# Patient Record
Sex: MALE | Race: WHITE | NOT HISPANIC OR LATINO | Employment: STUDENT | URBAN - METROPOLITAN AREA
[De-identification: names, ages, dates, MRNs, and addresses within clinical notes are randomized per-mention and may not be internally consistent; named-entity substitution may affect disease eponyms.]

---
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
## 2021-01-20 ENCOUNTER — IMMUNIZATION (OUTPATIENT)
Dept: VACCINE CLINIC | Facility: HOSPITAL | Age: 21
End: 2021-01-20

## 2021-01-20 PROCEDURE — 0001A: CPT | Performed by: INTERNAL MEDICINE

## 2021-01-20 PROCEDURE — 91300 HC SARSCOV02 VAC 30MCG/0.3ML IM: CPT | Performed by: INTERNAL MEDICINE

## 2021-02-10 ENCOUNTER — IMMUNIZATION (OUTPATIENT)
Dept: VACCINE CLINIC | Facility: HOSPITAL | Age: 21
End: 2021-02-10

## 2021-02-10 PROCEDURE — 0002A: CPT | Performed by: INTERNAL MEDICINE

## 2021-02-10 PROCEDURE — 91300 HC SARSCOV02 VAC 30MCG/0.3ML IM: CPT | Performed by: INTERNAL MEDICINE

## 2021-10-11 ENCOUNTER — OFFICE VISIT (OUTPATIENT)
Dept: INTERNAL MEDICINE | Facility: CLINIC | Age: 21
End: 2021-10-11

## 2021-10-11 VITALS
BODY MASS INDEX: 18.79 KG/M2 | TEMPERATURE: 97.7 F | SYSTOLIC BLOOD PRESSURE: 138 MMHG | WEIGHT: 124 LBS | DIASTOLIC BLOOD PRESSURE: 72 MMHG | HEART RATE: 100 BPM | HEIGHT: 68 IN

## 2021-10-11 DIAGNOSIS — Z13.220 LIPID SCREENING: ICD-10-CM

## 2021-10-11 DIAGNOSIS — Z00.00 ROUTINE MEDICAL EXAM: Primary | ICD-10-CM

## 2021-10-11 DIAGNOSIS — Z13.0 SCREENING FOR DEFICIENCY ANEMIA: ICD-10-CM

## 2021-10-11 DIAGNOSIS — Z13.29 SCREENING FOR ENDOCRINE DISORDER: ICD-10-CM

## 2021-10-11 DIAGNOSIS — Z11.3 ROUTINE SCREENING FOR STI (SEXUALLY TRANSMITTED INFECTION): ICD-10-CM

## 2021-10-11 PROCEDURE — 90471 IMMUNIZATION ADMIN: CPT | Performed by: NURSE PRACTITIONER

## 2021-10-11 PROCEDURE — 99395 PREV VISIT EST AGE 18-39: CPT | Performed by: NURSE PRACTITIONER

## 2021-10-11 PROCEDURE — 90686 IIV4 VACC NO PRSV 0.5 ML IM: CPT | Performed by: NURSE PRACTITIONER

## 2021-10-11 NOTE — PROGRESS NOTES
"Reg Nichols  2000  [id number]  Patient Care Team:  Carmen Arnold APRN as PCP - General (Internal Medicine)    Reg Nichols is a pleasant 21 y.o. male who presents for evaluation of Annual Exam      Chief Complaint   Patient presents with   • Annual Exam       HPI:   Will is here for his routine medical exam.  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  He is from [place].  He is adopted.  He overall feels well, has plenty of energy.  Has a good appetite but has to remind himself to eat when he is focused on a project.  He is always been \"skinny \".  no regular exercise.  No tobacco use or illegal drug use, rarely drinks alcohol.  Wears seatbelts.  Well is homosexual and has had 4 relationships but no intercourse.  He would like to discuss Prep before becoming active.    History reviewed. No pertinent past medical history.  History reviewed. No pertinent surgical history.  Family History   Adopted: Yes   Family history unknown: Yes     Social History     Tobacco Use   Smoking Status Never Smoker   Smokeless Tobacco Never Used     No Known Allergies  No current outpatient medications on file.    Review of Systems  /72 (BP Location: Left arm, Patient Position: Sitting, Cuff Size: Adult)   Pulse 100   Temp 97.7 °F (36.5 °C) (Temporal)   Ht 173.5 cm (68.31\")   Wt 56.2 kg (124 lb)   BMI 18.69 kg/m²     Physical Exam  Constitutional:       Appearance: He is well-developed.   HENT:      Head: Normocephalic and atraumatic.      Comments: *wearing mask  Eyes:      Conjunctiva/sclera: Conjunctivae normal.      Pupils: Pupils are equal, round, and reactive to light.   Cardiovascular:      Rate and Rhythm: Normal rate and regular rhythm.      Pulses: Normal pulses.      Heart sounds: Normal heart sounds.   Pulmonary:      Effort: Pulmonary effort is normal.      Breath sounds: Normal breath sounds.   Musculoskeletal:         General: Normal range of " motion.      Cervical back: Normal range of motion and neck supple.   Skin:     General: Skin is warm and dry.   Neurological:      Mental Status: He is alert and oriented to person, place, and time.   Psychiatric:         Mood and Affect: Mood normal.         Behavior: Behavior normal.         Thought Content: Thought content normal.         Judgment: Judgment normal.         Procedures    Results Review:  None    PHQ-9 Total Score: 0    Assessment/Plan:  Diagnoses and all orders for this visit:    1. Routine medical exam (Primary)  Comments:  Will return for labs    2. Routine screening for STI (sexually transmitted infection)  Comments:  discussed starting Prep after results are available.  Orders:  -     Comprehensive Metabolic Panel; Future  -     RPR; Future  -     HIV-1 & HIV-2 Antibodies; Future  -     Chlamydia trachomatis, Neisseria gonorrhoeae, PCR - Swab, Urine, Clean Catch; Future    3. Screening for endocrine disorder  -     TSH Rfx On Abnormal To Free T4; Future    4. Lipid screening  -     Lipid Panel; Future    5. Screening for deficiency anemia  -     CBC & Differential; Future    Other orders  -     FluLaval/Fluarix (VFC) >6 Months       Patient Instructions   Preventive Care 21-39 Years Old, Male  Preventive care refers to lifestyle choices and visits with your health care provider that can promote health and wellness. This includes:  · A yearly physical exam. This is also called an annual wellness visit.  · Regular dental and eye exams.  · Immunizations.  · Screening for certain conditions.  · Healthy lifestyle choices, such as:  ? Eating a healthy diet.  ? Getting regular exercise.  ? Not using drugs or products that contain nicotine and tobacco.  ? Limiting alcohol use.  What can I expect for my preventive care visit?  Physical exam  Your health care provider may check your:  · Height and weight. These may be used to calculate your BMI (body mass index). BMI is a measurement that tells if you  are at a healthy weight.  · Heart rate and blood pressure.  · Body temperature.  · Skin for abnormal spots.  Counseling  Your health care provider may ask you questions about your:  · Past medical problems.  · Family's medical history.  · Alcohol, tobacco, and drug use.  · Emotional well-being.  · Home life and relationship well-being.  · Sexual activity.  · Diet, exercise, and sleep habits.  · Work and work environment.  · Access to firearms.  What immunizations do I need?    Vaccines are usually given at various ages, according to a schedule. Your health care provider will recommend vaccines for you based on your age, medical history, and lifestyle or other factors, such as travel or where you work.  What tests do I need?  Blood tests  · Lipid and cholesterol levels. These may be checked every 5 years starting at age 20.  · Hepatitis C test.  · Hepatitis B test.  Screening    · Diabetes screening. This is done by checking your blood sugar (glucose) after you have not eaten for a while (fasting).  · Genital exam to check for testicular cancer or hernias.  · STD (sexually transmitted disease) testing, if you are at risk.  Talk with your health care provider about your test results, treatment options, and if necessary, the need for more tests.  Follow these instructions at home:  Eating and drinking    · Eat a healthy diet that includes fresh fruits and vegetables, whole grains, lean protein, and low-fat dairy products.  · Drink enough fluid to keep your urine pale yellow.  · Take vitamin and mineral supplements as recommended by your health care provider.  · Do not drink alcohol if your health care provider tells you not to drink.  · If you drink alcohol:  ? Limit how much you have to 0-2 drinks a day.  ? Be aware of how much alcohol is in your drink. In the [place] one drink equals one 12 oz bottle of beer (355 mL), one 5 oz glass of wine (148 mL), or one 1½ oz glass of hard liquor (44 mL).    Lifestyle  · Take daily  care of your teeth and gums. Brush your teeth every morning and night with fluoride toothpaste. Floss one time each day.  · Stay active. Exercise for at least 30 minutes 5 or more days each week.  · Do not use any products that contain nicotine or tobacco, such as cigarettes, e-cigarettes, and chewing tobacco. If you need help quitting, ask your health care provider.  · Do not use drugs.  · If you are sexually active, practice safe sex. Use a condom or other form of protection to prevent STIs (sexually transmitted infections).  · Find healthy ways to cope with stress, such as:  ? Meditation, yoga, or listening to music.  ? Journaling.  ? Talking to a trusted person.  ? Spending time with friends and family.  Safety  · Always wear your seat belt while driving or riding in a vehicle.  · Do not drive:  ? If you have been drinking alcohol. Do not ride with someone who has been drinking.  ? When you are tired or distracted.  ? While texting.  · Wear a helmet and other protective equipment during sports activities.  · If you have firearms in your house, make sure you follow all gun safety procedures.  · Seek help if you have been physically or sexually abused.  What's next?  · Go to your health care provider once a year for an annual wellness visit.  · Ask your health care provider how often you should have your eyes and teeth checked.  · Stay up to date on all vaccines.  This information is not intended to replace advice given to you by your health care provider. Make sure you discuss any questions you have with your health care provider.  Document Revised: 09/02/2020 Document Reviewed: 12/12/2019  [employer] Patient Education © 2021 [employer]        Plan of care reviewed with patient at the conclusion of today's visit. Education was provided regarding diagnosis, management and any prescribed or recommended OTC medications.  Patient verbalizes understanding of and agreement with management plan.    Return in about 3  months (around 1/11/2022) for Next scheduled follow up.    *Note that portions of this note were completed with a voice recognition program.  Efforts were made to edit the dictation but occasionally words are transcribed.    Carmen Arnold, APRN

## 2021-10-11 NOTE — PATIENT INSTRUCTIONS
Preventive Care 21-39 Years Old, Male  Preventive care refers to lifestyle choices and visits with your health care provider that can promote health and wellness. This includes:  · A yearly physical exam. This is also called an annual wellness visit.  · Regular dental and eye exams.  · Immunizations.  · Screening for certain conditions.  · Healthy lifestyle choices, such as:  ? Eating a healthy diet.  ? Getting regular exercise.  ? Not using drugs or products that contain nicotine and tobacco.  ? Limiting alcohol use.  What can I expect for my preventive care visit?  Physical exam  Your health care provider may check your:  · Height and weight. These may be used to calculate your BMI (body mass index). BMI is a measurement that tells if you are at a healthy weight.  · Heart rate and blood pressure.  · Body temperature.  · Skin for abnormal spots.  Counseling  Your health care provider may ask you questions about your:  · Past medical problems.  · Family's medical history.  · Alcohol, tobacco, and drug use.  · Emotional well-being.  · Home life and relationship well-being.  · Sexual activity.  · Diet, exercise, and sleep habits.  · Work and work environment.  · Access to firearms.  What immunizations do I need?    Vaccines are usually given at various ages, according to a schedule. Your health care provider will recommend vaccines for you based on your age, medical history, and lifestyle or other factors, such as travel or where you work.  What tests do I need?  Blood tests  · Lipid and cholesterol levels. These may be checked every 5 years starting at age 20.  · Hepatitis C test.  · Hepatitis B test.  Screening    · Diabetes screening. This is done by checking your blood sugar (glucose) after you have not eaten for a while (fasting).  · Genital exam to check for testicular cancer or hernias.  · STD (sexually transmitted disease) testing, if you are at risk.  Talk with your health care provider about your test  results, treatment options, and if necessary, the need for more tests.  Follow these instructions at home:  Eating and drinking    · Eat a healthy diet that includes fresh fruits and vegetables, whole grains, lean protein, and low-fat dairy products.  · Drink enough fluid to keep your urine pale yellow.  · Take vitamin and mineral supplements as recommended by your health care provider.  · Do not drink alcohol if your health care provider tells you not to drink.  · If you drink alcohol:  ? Limit how much you have to 0-2 drinks a day.  ? Be aware of how much alcohol is in your drink. In the [place] one drink equals one 12 oz bottle of beer (355 mL), one 5 oz glass of wine (148 mL), or one 1½ oz glass of hard liquor (44 mL).    Lifestyle  · Take daily care of your teeth and gums. Brush your teeth every morning and night with fluoride toothpaste. Floss one time each day.  · Stay active. Exercise for at least 30 minutes 5 or more days each week.  · Do not use any products that contain nicotine or tobacco, such as cigarettes, e-cigarettes, and chewing tobacco. If you need help quitting, ask your health care provider.  · Do not use drugs.  · If you are sexually active, practice safe sex. Use a condom or other form of protection to prevent STIs (sexually transmitted infections).  · Find healthy ways to cope with stress, such as:  ? Meditation, yoga, or listening to music.  ? Journaling.  ? Talking to a trusted person.  ? Spending time with friends and family.  Safety  · Always wear your seat belt while driving or riding in a vehicle.  · Do not drive:  ? If you have been drinking alcohol. Do not ride with someone who has been drinking.  ? When you are tired or distracted.  ? While texting.  · Wear a helmet and other protective equipment during sports activities.  · If you have firearms in your house, make sure you follow all gun safety procedures.  · Seek help if you have been physically or sexually abused.  What's  next?  · Go to your health care provider once a year for an annual wellness visit.  · Ask your health care provider how often you should have your eyes and teeth checked.  · Stay up to date on all vaccines.  This information is not intended to replace advice given to you by your health care provider. Make sure you discuss any questions you have with your health care provider.  Document Revised: 09/02/2020 Document Reviewed: 12/12/2019  [employer] Patient Education © 2021 [employer]

## 2021-11-11 ENCOUNTER — OFFICE VISIT (OUTPATIENT)
Dept: INTERNAL MEDICINE | Facility: CLINIC | Age: 21
End: 2021-11-11

## 2021-11-11 VITALS
WEIGHT: 123 LBS | HEIGHT: 68 IN | BODY MASS INDEX: 18.64 KG/M2 | TEMPERATURE: 98.7 F | SYSTOLIC BLOOD PRESSURE: 110 MMHG | HEART RATE: 84 BPM | DIASTOLIC BLOOD PRESSURE: 70 MMHG

## 2021-11-11 DIAGNOSIS — J02.9 SORE THROAT: Primary | ICD-10-CM

## 2021-11-11 LAB
EXPIRATION DATE: NORMAL
INTERNAL CONTROL: NORMAL
Lab: NORMAL
S PYO RRNA THROAT QL PROBE: NEGATIVE

## 2021-11-11 PROCEDURE — U0004 COV-19 TEST NON-CDC HGH THRU: HCPCS | Performed by: NURSE PRACTITIONER

## 2021-11-11 PROCEDURE — 99213 OFFICE O/P EST LOW 20 MIN: CPT | Performed by: NURSE PRACTITIONER

## 2021-11-11 PROCEDURE — 87651 STREP A DNA AMP PROBE: CPT | Performed by: NURSE PRACTITIONER

## 2021-11-11 RX ORDER — AZITHROMYCIN 250 MG/1
TABLET, FILM COATED ORAL
Qty: 6 TABLET | Refills: 0 | Status: SHIPPED | OUTPATIENT
Start: 2021-11-11 | End: 2022-12-07

## 2021-11-11 NOTE — PROGRESS NOTES
"  Follow Up Office Visit      Patient Name: Reg Nichols  : 2000   MRN: [id number]   Care Team: Patient Care Team:  Carmen Arnold APRN as PCP - General (Internal Medicine)    Chief Complaint:    Chief Complaint   Patient presents with   • Sore Throat       History of Present Illness: Reg Nichols is a 21 y.o. male who is overall healthy with no chronic health conditions.  He presents today for new issue of sore throat x2 days.  Reports he had similar symptoms about 1 month ago and \" just let it run its course\" and symptoms resolved.  However, now started back in the last 2 days.    Denies any associated headaches, fever, chills, earache, nausea, vomiting, cough, wheezing or shortness of breath.    He is noted to be vaccinated for COVID-19, with 2 doses previously.    He denies any known exposure to Covid.  States that his symptoms feel exactly the same as when he has had strep throat in the past as a child.    He is a student at  and has no history of Covid or positive Covid test in the past.    Subjective      Review of Systems:   Review of Systems   Constitutional: Negative for chills and fever.   HENT: Positive for postnasal drip and sore throat.    Respiratory: Negative for cough and shortness of breath.    Cardiovascular: Negative for chest pain.   Neurological: Negative for headache.       I have reviewed and the following portions of the patient's history were updated as appropriate: past family history, past medical history, past social history, past surgical history and problem list.    Medications:     Current Outpatient Medications:   •  azithromycin (Zithromax Z-Uche) 250 MG tablet, Take 2 tablets by mouth on day 1, then 1 tablet daily on days 2-5, Disp: 6 tablet, Rfl: 0    Allergies:   No Known Allergies    Objective     Physical Exam:  Vital Signs:   Vitals:    21 0927   BP: 110/70   BP Location: Left arm   Patient Position: Sitting   Cuff Size: Adult   Pulse: 84   Temp: 98.7 " "°F (37.1 °C)   TempSrc: Temporal   Weight: 55.8 kg (123 lb)   Height: 173.5 cm (68.31\")   PainSc:   4   PainLoc: Throat     Body mass index is 18.53 kg/m².     Physical Exam  Vitals and nursing note reviewed.   Constitutional:       General: He is not in acute distress.     Appearance: Normal appearance.   HENT:      Head: Normocephalic and atraumatic.      Right Ear: Tympanic membrane, ear canal and external ear normal.      Left Ear: Tympanic membrane, ear canal and external ear normal.      Nose: Congestion present.      Mouth/Throat:      Pharynx: Posterior oropharyngeal erythema present.      Comments: PMD noted on posterior pharynx.  Eyes:      General: No scleral icterus.  Cardiovascular:      Rate and Rhythm: Normal rate and regular rhythm.   Pulmonary:      Effort: Pulmonary effort is normal. No respiratory distress.      Breath sounds: Normal breath sounds.   Musculoskeletal:      Cervical back: No tenderness.   Lymphadenopathy:      Cervical: No cervical adenopathy.   Neurological:      Mental Status: He is alert.   Psychiatric:         Mood and Affect: Mood normal.         Thought Content: Thought content normal.         Judgment: Judgment normal.         Assessment / Plan      Assessment/Plan:   Problems Addressed This Visit    ICD-10-CM ICD-9-CM   1. Sore throat  J02.9 462     Sore throat  -Strep screen negative  -Covid test pending at this time  -Provided patient with school excuse x2 days until we get results of Covid test back  -Given his erythema on the oropharynx, covering for strep with course of a azithromycin x5 days (Z-Uche)  -Recommend rest, hydration for now    Plan of care reviewed with patient at the conclusion of today's visit. Education was provided regarding diagnosis and management.  Patient verbalizes understanding of and agreement with management plan.    Patient Instructions   Sore Throat  A sore throat is pain, burning, irritation, or scratchiness in the throat. When you have a sore " throat, you may feel pain or tenderness in your throat when you swallow or talk.  Many things can cause a sore throat, including:  · An infection.  · Seasonal allergies.  · Dryness in the air.  · Irritants, such as smoke or pollution.  · Radiation treatment to the area.  · Gastroesophageal reflux disease (GERD).  · A tumor.  A sore throat is often the first sign of another sickness. It may happen with other symptoms, such as coughing, sneezing, fever, and swollen neck glands. Most sore throats go away without medical treatment.  Follow these instructions at home:         · Take over-the-counter medicines only as told by your health care provider.  ? If your child has a sore throat, do not give your child aspirin because of the association with Reye syndrome.  · Drink enough fluids to keep your urine pale yellow.  · Rest as needed.  · To help with pain, try:  ? Sipping warm liquids, such as broth, herbal tea, or warm water.  ? Eating or drinking cold or frozen liquids, such as frozen ice pops.  ? Gargling with a salt-water mixture 3-4 times a day or as needed. To make a salt-water mixture, completely dissolve ½-1 tsp (3-6 g) of salt in 1 cup (237 mL) of warm water.  ? Sucking on hard candy or throat lozenges.  ? Putting a cool-mist humidifier in your bedroom at night to moisten the air.  ? Sitting in the bathroom with the door closed for 5-10 minutes while you run hot water in the shower.  · Do not use any products that contain nicotine or tobacco, such as cigarettes, e-cigarettes, and chewing tobacco. If you need help quitting, ask your health care provider.  · Wash your hands well and often with soap and water. If soap and water are not available, use hand .  Contact a health care provider if:  · You have a fever for more than 2-3 days.  · You have symptoms that last (are persistent) for more than 2-3 days.  · Your throat does not get better within 7 days.  · You have a fever and your symptoms suddenly get  worse.  · Your child who is 3 months to 3 years old has a temperature of 102.2°F (39°C) or higher.  Get help right away if:  · You have difficulty breathing.  · You cannot swallow fluids, soft foods, or your saliva.  · You have increased swelling in your throat or neck.  · You have persistent nausea and vomiting.  Summary  · A sore throat is pain, burning, irritation, or scratchiness in the throat. Many things can cause a sore throat.  · Take over-the-counter medicines only as told by your health care provider. Do not give your child aspirin.  · Drink plenty of fluids, and rest as needed.  · Contact a health care provider if your symptoms worsen or your sore throat does not get better within 7 days.  This information is not intended to replace advice given to you by your health care provider. Make sure you discuss any questions you have with your health care provider.  Document Revised: 05/20/2019 Document Reviewed: 05/20/2019  [employer] Patient Education © 2021 [employer]        Follow Up:   Return if symptoms worsen or fail to improve.        ELADIA Shi  [facility] [address]    Please note that portions of this note were completed with a voice recognition program.

## 2021-11-11 NOTE — PATIENT INSTRUCTIONS
Sore Throat  A sore throat is pain, burning, irritation, or scratchiness in the throat. When you have a sore throat, you may feel pain or tenderness in your throat when you swallow or talk.  Many things can cause a sore throat, including:  · An infection.  · Seasonal allergies.  · Dryness in the air.  · Irritants, such as smoke or pollution.  · Radiation treatment to the area.  · Gastroesophageal reflux disease (GERD).  · A tumor.  A sore throat is often the first sign of another sickness. It may happen with other symptoms, such as coughing, sneezing, fever, and swollen neck glands. Most sore throats go away without medical treatment.  Follow these instructions at home:         · Take over-the-counter medicines only as told by your health care provider.  ? If your child has a sore throat, do not give your child aspirin because of the association with Reye syndrome.  · Drink enough fluids to keep your urine pale yellow.  · Rest as needed.  · To help with pain, try:  ? Sipping warm liquids, such as broth, herbal tea, or warm water.  ? Eating or drinking cold or frozen liquids, such as frozen ice pops.  ? Gargling with a salt-water mixture 3-4 times a day or as needed. To make a salt-water mixture, completely dissolve ½-1 tsp (3-6 g) of salt in 1 cup (237 mL) of warm water.  ? Sucking on hard candy or throat lozenges.  ? Putting a cool-mist humidifier in your bedroom at night to moisten the air.  ? Sitting in the bathroom with the door closed for 5-10 minutes while you run hot water in the shower.  · Do not use any products that contain nicotine or tobacco, such as cigarettes, e-cigarettes, and chewing tobacco. If you need help quitting, ask your health care provider.  · Wash your hands well and often with soap and water. If soap and water are not available, use hand .  Contact a health care provider if:  · You have a fever for more than 2-3 days.  · You have symptoms that last (are persistent) for more than  2-3 days.  · Your throat does not get better within 7 days.  · You have a fever and your symptoms suddenly get worse.  · Your child who is 3 months to 3 years old has a temperature of 102.2°F (39°C) or higher.  Get help right away if:  · You have difficulty breathing.  · You cannot swallow fluids, soft foods, or your saliva.  · You have increased swelling in your throat or neck.  · You have persistent nausea and vomiting.  Summary  · A sore throat is pain, burning, irritation, or scratchiness in the throat. Many things can cause a sore throat.  · Take over-the-counter medicines only as told by your health care provider. Do not give your child aspirin.  · Drink plenty of fluids, and rest as needed.  · Contact a health care provider if your symptoms worsen or your sore throat does not get better within 7 days.  This information is not intended to replace advice given to you by your health care provider. Make sure you discuss any questions you have with your health care provider.  Document Revised: 05/20/2019 Document Reviewed: 05/20/2019  [employer] Patient Education © 2021 [employer]

## 2021-11-12 ENCOUNTER — TELEPHONE (OUTPATIENT)
Dept: INTERNAL MEDICINE | Facility: CLINIC | Age: 21
End: 2021-11-12

## 2021-11-12 LAB — SARS-COV-2 RNA NOSE QL NAA+PROBE: NOT DETECTED

## 2022-11-30 ENCOUNTER — TELEPHONE (OUTPATIENT)
Dept: INTERNAL MEDICINE | Facility: CLINIC | Age: 22
End: 2022-11-30

## 2022-11-30 NOTE — TELEPHONE ENCOUNTER
Caller: Reg Nichols    Relationship: Self    Best call back number:[phone]    What orders are you requesting (i.e. lab or imaging): FULL PANEL LABS    In what timeframe would the patient need to come in: AS SOON AS POSSIBLE    Additional notes: PLEASE CONTACT PATIENT ONCE ORDERS HAVE BEEN PLACED.

## 2022-12-02 NOTE — TELEPHONE ENCOUNTER
He does not have an appointment scheduled yet.  It looks like he did not get his labs drawn last year after our visit.  I do not mind to put them in but need to know if he wants the STI testing added as well.  May just be better to order at visit so we can everything we need.

## 2022-12-07 ENCOUNTER — LAB (OUTPATIENT)
Dept: LAB | Facility: HOSPITAL | Age: 22
End: 2022-12-07

## 2022-12-07 ENCOUNTER — OFFICE VISIT (OUTPATIENT)
Dept: INTERNAL MEDICINE | Facility: CLINIC | Age: 22
End: 2022-12-07

## 2022-12-07 VITALS
TEMPERATURE: 97.8 F | SYSTOLIC BLOOD PRESSURE: 120 MMHG | DIASTOLIC BLOOD PRESSURE: 78 MMHG | WEIGHT: 122 LBS | HEART RATE: 88 BPM | HEIGHT: 68 IN | BODY MASS INDEX: 18.49 KG/M2

## 2022-12-07 DIAGNOSIS — Z13.220 LIPID SCREENING: ICD-10-CM

## 2022-12-07 DIAGNOSIS — Z11.3 ROUTINE SCREENING FOR STI (SEXUALLY TRANSMITTED INFECTION): ICD-10-CM

## 2022-12-07 DIAGNOSIS — Z00.00 ROUTINE MEDICAL EXAM: Primary | ICD-10-CM

## 2022-12-07 DIAGNOSIS — Z13.0 SCREENING FOR DEFICIENCY ANEMIA: ICD-10-CM

## 2022-12-07 DIAGNOSIS — Z13.29 SCREENING FOR ENDOCRINE DISORDER: ICD-10-CM

## 2022-12-07 LAB
BASOPHILS # BLD AUTO: 0.02 10*3/MM3 (ref 0–0.2)
BASOPHILS NFR BLD AUTO: 0.4 % (ref 0–1.5)
DEPRECATED RDW RBC AUTO: 40.8 FL (ref 37–54)
EOSINOPHIL # BLD AUTO: 0.03 10*3/MM3 (ref 0–0.4)
EOSINOPHIL NFR BLD AUTO: 0.5 % (ref 0.3–6.2)
ERYTHROCYTE [DISTWIDTH] IN BLOOD BY AUTOMATED COUNT: 12.2 % (ref 12.3–15.4)
HCT VFR BLD AUTO: 44.9 % (ref 37.5–51)
HGB BLD-MCNC: 15.8 G/DL (ref 13–17.7)
IMM GRANULOCYTES # BLD AUTO: 0.03 10*3/MM3 (ref 0–0.05)
IMM GRANULOCYTES NFR BLD AUTO: 0.5 % (ref 0–0.5)
LYMPHOCYTES # BLD AUTO: 1.37 10*3/MM3 (ref 0.7–3.1)
LYMPHOCYTES NFR BLD AUTO: 24.4 % (ref 19.6–45.3)
MCH RBC QN AUTO: 32.2 PG (ref 26.6–33)
MCHC RBC AUTO-ENTMCNC: 35.2 G/DL (ref 31.5–35.7)
MCV RBC AUTO: 91.4 FL (ref 79–97)
MONOCYTES # BLD AUTO: 0.42 10*3/MM3 (ref 0.1–0.9)
MONOCYTES NFR BLD AUTO: 7.5 % (ref 5–12)
NEUTROPHILS NFR BLD AUTO: 3.75 10*3/MM3 (ref 1.7–7)
NEUTROPHILS NFR BLD AUTO: 66.7 % (ref 42.7–76)
NRBC BLD AUTO-RTO: 0 /100 WBC (ref 0–0.2)
PLATELET # BLD AUTO: 262 10*3/MM3 (ref 140–450)
PMV BLD AUTO: 8.7 FL (ref 6–12)
RBC # BLD AUTO: 4.91 10*6/MM3 (ref 4.14–5.8)
WBC NRBC COR # BLD: 5.62 10*3/MM3 (ref 3.4–10.8)

## 2022-12-07 PROCEDURE — 87340 HEPATITIS B SURFACE AG IA: CPT

## 2022-12-07 PROCEDURE — 86696 HERPES SIMPLEX TYPE 2 TEST: CPT

## 2022-12-07 PROCEDURE — 36415 COLL VENOUS BLD VENIPUNCTURE: CPT

## 2022-12-07 PROCEDURE — 87591 N.GONORRHOEAE DNA AMP PROB: CPT

## 2022-12-07 PROCEDURE — 80050 GENERAL HEALTH PANEL: CPT

## 2022-12-07 PROCEDURE — 86706 HEP B SURFACE ANTIBODY: CPT

## 2022-12-07 PROCEDURE — 90471 IMMUNIZATION ADMIN: CPT | Performed by: NURSE PRACTITIONER

## 2022-12-07 PROCEDURE — 99395 PREV VISIT EST AGE 18-39: CPT | Performed by: NURSE PRACTITIONER

## 2022-12-07 PROCEDURE — 86803 HEPATITIS C AB TEST: CPT

## 2022-12-07 PROCEDURE — 90686 IIV4 VACC NO PRSV 0.5 ML IM: CPT | Performed by: NURSE PRACTITIONER

## 2022-12-07 PROCEDURE — 86695 HERPES SIMPLEX TYPE 1 TEST: CPT

## 2022-12-07 PROCEDURE — G0432 EIA HIV-1/HIV-2 SCREEN: HCPCS

## 2022-12-07 PROCEDURE — 86708 HEPATITIS A ANTIBODY: CPT

## 2022-12-07 PROCEDURE — 86704 HEP B CORE ANTIBODY TOTAL: CPT

## 2022-12-07 PROCEDURE — 87661 TRICHOMONAS VAGINALIS AMPLIF: CPT

## 2022-12-07 PROCEDURE — 86592 SYPHILIS TEST NON-TREP QUAL: CPT

## 2022-12-07 PROCEDURE — 80061 LIPID PANEL: CPT

## 2022-12-07 PROCEDURE — 87491 CHLMYD TRACH DNA AMP PROBE: CPT

## 2022-12-07 NOTE — PROGRESS NOTES
"Reg Nichols  2000  [id number]  Patient Care Team:  Carmen Arnold APRN as PCP - General (Internal Medicine)    Reg Nichols is a pleasant 22 y.o. male who presents for evaluation of Labs Only    Chief Complaint   Patient presents with   • Labs Only       HPI:   The patient presents today for a follow-up visit. He is accompanied by an adult female friend.    Health maintenance  The patient states that he is doing well overall. He denies any recent hospitalizations or emergency room visits. The patient relays that he has been sexually active this year. He denies any recent testing for sexually transmitted diseases. He relays that he would like to be tested. He is interested in starting prep.    The patient states that he believes he has received the hepatitis B vaccine. He states that he has a good appetite. The patient states that he does not eat 3 meals a day. He denies any nausea after eating. The patient denies any history of eating disorder. He states that he does not exercise.  He reports he has always been thin. The patient denies any heartburn, indigestion, constipation, diarrhea, abdominal pain, or leg swelling.      History reviewed. No pertinent past medical history.  History reviewed. No pertinent surgical history.  Family History   Adopted: Yes   Family history unknown: Yes     Social History     Tobacco Use   Smoking Status Never   Smokeless Tobacco Never     No Known Allergies  No current outpatient medications on file.    Review of Systems  Review of systems was completed, and pertinent findings are noted in the HPI.  /78 (BP Location: Left arm, Patient Position: Sitting, Cuff Size: Adult)   Pulse 88   Temp 97.8 °F (36.6 °C) (Temporal)   Ht 173.5 cm (68.31\")   Wt 55.3 kg (122 lb)   BMI 18.38 kg/m²     Physical Exam  Constitutional:       Appearance: He is well-developed and underweight.   HENT:      Head: Normocephalic and atraumatic.      Comments: *wearing mask  Eyes:      " Conjunctiva/sclera: Conjunctivae normal.      Pupils: Pupils are equal, round, and reactive to light.   Cardiovascular:      Rate and Rhythm: Normal rate and regular rhythm.      Pulses: Normal pulses.      Heart sounds: Normal heart sounds.   Pulmonary:      Effort: Pulmonary effort is normal.      Breath sounds: Normal breath sounds.   Musculoskeletal:         General: Normal range of motion.      Cervical back: Normal range of motion and neck supple.   Skin:     General: Skin is warm and dry.   Neurological:      Mental Status: He is alert and oriented to person, place, and time.   Psychiatric:         Mood and Affect: Mood normal.         Behavior: Behavior normal.         Thought Content: Thought content normal.         Judgment: Judgment normal.         Procedures    PHQ-9 Total Score:      Assessment/Plan:  Diagnoses and all orders for this visit:    1. Routine medical exam (Primary)    2. Routine screening for STI (sexually transmitted infection)  -     Chlamydia trachomatis, Neisseria gonorrhoeae, PCR - Swab, Urinary Bladder; Future  -     Trichomonas vaginalis, PCR - Swab, Urine, Clean Catch; Future  -     RPR; Future  -     HIV-1 / O / 2 Ag / Antibody 4th Generation; Future  -     Hepatitis B surface antigen; Future  -     Hepatitis B surface antibody; Future  -     Hepatitis B core antibody, total; Future  -     Hepatitis A antibody, total; Future  -     Hepatitis C antibody; Future  -     HSV 1 & 2 - Specific Antibody, IgG; Future    3. Screening for deficiency anemia  -     CBC & Differential; Future  -     Comprehensive Metabolic Panel; Future    4. Screening for endocrine disorder  -     TSH Rfx On Abnormal To Free T4; Future    5. Lipid screening  -     Lipid Panel; Future    Other orders  -     FluLaval/Fluarix/Fluzone >6 Months     1. Routine screening for STI (sexually transmitted infection) (Primary)  Comments:   - The patient will also be written a prescription of Descovy.    There are no  Patient Instructions on file for this visit.  Plan of care reviewed with patient at the conclusion of today's visit. Education was provided regarding diagnosis, management and any prescribed or recommended OTC medications.  Patient verbalizes understanding of and agreement with management plan.    Return in about 3 months (around 3/7/2023) for Annual physical, Labs this visit, Next scheduled follow up.    Dictated Utilizing Dragon Dictation.    ELADIA Fischer    Transcribed from ambient dictation for ELADIA Fischer by Cintia Blanco.  12/07/22   17:35 EST    Patient or patient representative verbalized consent to the visit recording.  I have personally performed the services described in this document as transcribed by the above individual, and it is both accurate and complete.

## 2022-12-08 LAB
ALBUMIN SERPL-MCNC: 5.1 G/DL (ref 3.5–5.2)
ALBUMIN/GLOB SERPL: 1.9 G/DL
ALP SERPL-CCNC: 87 U/L (ref 39–117)
ALT SERPL W P-5'-P-CCNC: 23 U/L (ref 1–41)
ANION GAP SERPL CALCULATED.3IONS-SCNC: 15.9 MMOL/L (ref 5–15)
AST SERPL-CCNC: 20 U/L (ref 1–40)
BILIRUB SERPL-MCNC: 0.5 MG/DL (ref 0–1.2)
BUN SERPL-MCNC: 9 MG/DL (ref 6–20)
BUN/CREAT SERPL: 10.5 (ref 7–25)
CALCIUM SPEC-SCNC: 9.7 MG/DL (ref 8.6–10.5)
CHLORIDE SERPL-SCNC: 100 MMOL/L (ref 98–107)
CHOLEST SERPL-MCNC: 188 MG/DL (ref 0–200)
CO2 SERPL-SCNC: 25.1 MMOL/L (ref 22–29)
CREAT SERPL-MCNC: 0.86 MG/DL (ref 0.76–1.27)
EGFRCR SERPLBLD CKD-EPI 2021: 126.3 ML/MIN/1.73
GLOBULIN UR ELPH-MCNC: 2.7 GM/DL
GLUCOSE SERPL-MCNC: 91 MG/DL (ref 65–99)
HBV SURFACE AB SER RIA-ACNC: NORMAL
HBV SURFACE AG SERPL QL IA: NORMAL
HCV AB SER DONR QL: NORMAL
HDLC SERPL-MCNC: 49 MG/DL (ref 40–60)
HIV1+2 AB SER QL: NORMAL
LDLC SERPL CALC-MCNC: 128 MG/DL (ref 0–100)
LDLC/HDLC SERPL: 2.6 {RATIO}
POTASSIUM SERPL-SCNC: 3.6 MMOL/L (ref 3.5–5.2)
PROT SERPL-MCNC: 7.8 G/DL (ref 6–8.5)
RPR SER QL: NORMAL
SODIUM SERPL-SCNC: 141 MMOL/L (ref 136–145)
TRIGL SERPL-MCNC: 58 MG/DL (ref 0–150)
TSH SERPL DL<=0.05 MIU/L-ACNC: 1.11 UIU/ML (ref 0.27–4.2)
VLDLC SERPL-MCNC: 11 MG/DL (ref 5–40)

## 2022-12-09 LAB
HAV AB SER QL IA: POSITIVE
HBV CORE AB SERPL QL IA: NEGATIVE
HSV1 IGG SER IA-ACNC: <0.91 INDEX (ref 0–0.9)
HSV2 IGG SER IA-ACNC: <0.91 INDEX (ref 0–0.9)

## 2022-12-10 LAB
C TRACH RRNA SPEC QL NAA+PROBE: NEGATIVE
N GONORRHOEA RRNA SPEC QL NAA+PROBE: NEGATIVE
T VAGINALIS RRNA SPEC QL NAA+PROBE: NEGATIVE

## 2022-12-12 ENCOUNTER — PRIOR AUTHORIZATION (OUTPATIENT)
Dept: INTERNAL MEDICINE | Facility: CLINIC | Age: 22
End: 2022-12-12

## 2022-12-12 PROBLEM — Z72.52 HIGH RISK HOMOSEXUAL BEHAVIOR: Status: ACTIVE | Noted: 2022-12-12

## 2022-12-12 RX ORDER — EMTRICITABINE AND TENOFOVIR ALAFENAMIDE 200; 25 MG/1; MG/1
1 TABLET ORAL DAILY
Qty: 90 TABLET | Refills: 0 | Status: SHIPPED | OUTPATIENT
Start: 2022-12-12 | End: 2022-12-16

## 2022-12-16 ENCOUNTER — TELEPHONE (OUTPATIENT)
Dept: INTERNAL MEDICINE | Facility: CLINIC | Age: 22
End: 2022-12-16

## 2022-12-16 RX ORDER — EMTRICITABINE AND TENOFOVIR DISOPROXIL FUMARATE 200; 300 MG/1; MG/1
1 TABLET, FILM COATED ORAL DAILY
Qty: 90 TABLET | Refills: 0 | Status: SHIPPED | OUTPATIENT
Start: 2022-12-16 | End: 2023-03-20 | Stop reason: SDUPTHER

## 2022-12-16 NOTE — TELEPHONE ENCOUNTER
Okay, let him know the insurance wants him to try Truvada first.  I will send it into his pharmacy.

## 2022-12-16 NOTE — TELEPHONE ENCOUNTER
PRIOR AUTHORIZATION FOR THE MEDICATION DESCOVY WAS DENIED BECAUSE THEY DID NOT SEE WHAT THEY NEEDED TO SEE FOR MEDICAL NECESSITY.    DR WILL HAVE 180 DAYS TO DO AN APPEAL.  [phone] OPT 4 REF # [id number]

## 2023-03-20 RX ORDER — EMTRICITABINE AND TENOFOVIR DISOPROXIL FUMARATE 200; 300 MG/1; MG/1
1 TABLET, FILM COATED ORAL DAILY
Qty: 90 TABLET | Refills: 0 | Status: SHIPPED | OUTPATIENT
Start: 2023-03-20

## 2023-03-20 NOTE — TELEPHONE ENCOUNTER
"    Caller: Reg Nichols \"Will\"    Relationship: Self    Best call back number: [phone]    Requested Prescriptions:   Requested Prescriptions     Pending Prescriptions Disp Refills   • emtricitabine-tenofovir (Truvada) 200-300 MG per tablet 90 tablet 0     Sig: Take 1 tablet by mouth Daily.        Pharmacy where request should be sent: [id number], located within [facility] [address] [facility] [address] [facility] [phone] [facility] [phone] FX     Additional details provided by patient: 4-5 DAYS LEFT. PATIENT WOULD LIKE TO ASK IF HE WILL NEED BLOOD WORK BEFORE MEDICATION CAN BE REFILLED. PATIENT STATES IF LABS ARE REQUIRED HE WOULD LIKE LABS DRAWN AT [facility].     Does the patient have less than a 3 day supply:  [] Yes  [x] No    Would you like a call back once the refill request has been completed: [x] Yes [] No    If the office needs to give you a call back, can they leave a voicemail: [x] Yes [] No    Angelica Solomon Rep   03/20/23 15:54 EDT         "

## 2023-04-12 ENCOUNTER — OFFICE VISIT (OUTPATIENT)
Dept: INTERNAL MEDICINE | Facility: CLINIC | Age: 23
End: 2023-04-12
Payer: COMMERCIAL

## 2023-04-12 VITALS
BODY MASS INDEX: 17.33 KG/M2 | SYSTOLIC BLOOD PRESSURE: 118 MMHG | TEMPERATURE: 98.4 F | HEIGHT: 69 IN | DIASTOLIC BLOOD PRESSURE: 78 MMHG | WEIGHT: 117 LBS | HEART RATE: 72 BPM

## 2023-04-12 DIAGNOSIS — Z72.52 HIGH RISK HOMOSEXUAL BEHAVIOR: ICD-10-CM

## 2023-04-12 DIAGNOSIS — R63.6 UNDERWEIGHT: ICD-10-CM

## 2023-04-12 DIAGNOSIS — Z00.00 ROUTINE MEDICAL EXAM: Primary | ICD-10-CM

## 2023-04-12 RX ORDER — EMTRICITABINE AND TENOFOVIR DISOPROXIL FUMARATE 200; 300 MG/1; MG/1
1 TABLET, FILM COATED ORAL DAILY
Qty: 90 TABLET | Refills: 0 | Status: SHIPPED | OUTPATIENT
Start: 2023-04-12

## 2023-04-12 NOTE — PROGRESS NOTES
"Reg Nichols  2000  [id number]  Patient Care Team:  Carmen Arnold APRN as PCP - General (Internal Medicine)    Reg Nichols is a 23 y.o. pleasant male here today for annual physical.  Chief Complaint   Patient presents with   • Annual Exam       HPI:   Reg Nichols is a 22-year-old male who presents for a 3-month follow-up to continue Truvada.    The patient has a BMI of 17 and has lost 5 pounds since his last visit. He denies a decrease in his appetite or energy level. He admits he has not been eating as much and often forgets to eat; however, he does snack throughout the day. He eats a normal amount of protein on the weekends but not weekdays. He denies any diarrhea. He is adopted and does not know his family medical history.    The patient is taking Truvada without side effects. He denied any penetrative sex in the last 3 months.   Past Medical History:   Diagnosis Date   • Allergic 2008   • Anxiety 2014   • Asthma 2008   • Scoliosis 2015     Past Surgical History:   Procedure Laterality Date   • APPENDECTOMY  2007   • TONSILLECTOMY  2010     Family History   Adopted: Yes   Family history unknown: Yes     Social History     Tobacco Use   Smoking Status Never   Smokeless Tobacco Never     No Known Allergies    Current Outpatient Medications:   •  emtricitabine-tenofovir (Truvada) 200-300 MG per tablet, Take 1 tablet by mouth Daily., Disp: 90 tablet, Rfl: 0    Review of Systems  Review of systems was completed, and pertinent findings are noted in the HPI.    /78   Pulse 72   Temp 98.4 °F (36.9 °C) (Temporal)   Ht 174 cm (68.5\")   Wt 53.1 kg (117 lb)   BMI 17.53 kg/m²     Physical Exam  Constitutional:       Appearance: He is well-developed and underweight.   HENT:      Head: Normocephalic and atraumatic.   Eyes:      Conjunctiva/sclera: Conjunctivae normal.      Pupils: Pupils are equal, round, and reactive to light.   Cardiovascular:      Rate and Rhythm: Normal rate and regular rhythm. "      Pulses: Normal pulses.      Heart sounds: Normal heart sounds.   Pulmonary:      Effort: Pulmonary effort is normal.      Breath sounds: Normal breath sounds.   Musculoskeletal:         General: Normal range of motion.      Cervical back: Normal range of motion and neck supple.   Skin:     General: Skin is warm and dry.   Neurological:      Mental Status: He is alert and oriented to person, place, and time.   Psychiatric:         Mood and Affect: Mood normal.         Behavior: Behavior normal.         Thought Content: Thought content normal.         Judgment: Judgment normal.         Results Review:  None    PHQ-9 Total Score:            Assessment/Plan:  Diagnoses and all orders for this visit:    1. Routine medical exam (Primary)    2. High risk homosexual behavior  -     emtricitabine-tenofovir (Truvada) 200-300 MG per tablet; Take 1 tablet by mouth Daily.  Dispense: 90 tablet; Refill: 0    3. Underweight     Underweight       -     The patient will obtain and review laboratory results and discuss those results with the patient.       -     The patient has a BMI of 17 percent and has lost 5 pounds since his last visit. We discussed protein and calorie supplements that contain 30 grams of calories or less.      High risk homosexual behavior       -     We will continue every 3-month laboratory studies for STI's and HIV.        -     We discussed preventing transmitted infections such as using condoms.        -     Continue Truvada as directed.        -     I will obtain and review laboratory results and discuss those results with the patient for further treatment.    Patient Instructions   Preventive Care 21-39 Years Old, Male  Preventive care refers to lifestyle choices and visits with your health care provider that can promote health and wellness. Preventive care visits are also called wellness exams.  What can I expect for my preventive care visit?  Counseling  During your preventive care visit, your  health care provider may ask about your:  • Medical history, including:  ? Past medical problems.  ? Family medical history.  • Current health, including:  ? Emotional well-being.  ? Home life and relationship well-being.  ? Sexual activity.  • Lifestyle, including:  ? Alcohol, nicotine or tobacco, and drug use.  ? Access to firearms.  ? Diet, exercise, and sleep habits.  ? Safety issues such as seatbelt and bike helmet use.  ? Sunscreen use.  ? Work and work environment.  Physical exam  Your health care provider may check your:  • Height and weight. These may be used to calculate your BMI (body mass index). BMI is a measurement that tells if you are at a healthy weight.  • Waist circumference. This measures the distance around your waistline. This measurement also tells if you are at a healthy weight and may help predict your risk of certain diseases, such as type 2 diabetes and high blood pressure.  • Heart rate and blood pressure.  • Body temperature.  • Skin for abnormal spots.  What immunizations do I need?  Vaccines are usually given at various ages, according to a schedule. Your health care provider will recommend vaccines for you based on your age, medical history, and lifestyle or other factors, such as travel or where you work.  What tests do I need?  Screening  Your health care provider may recommend screening tests for certain conditions. This may include:  • Lipid and cholesterol levels.  • Diabetes screening. This is done by checking your blood sugar (glucose) after you have not eaten for a while (fasting).  • Hepatitis B test.  • Hepatitis C test.  • HIV (human immunodeficiency virus) test.  • STI (sexually transmitted infection) testing, if you are at risk.  Talk with your health care provider about your test results, treatment options, and if necessary, the need for more tests.  Follow these instructions at home:  Eating and drinking    • Eat a healthy diet that includes fresh fruits and vegetables,  whole grains, lean protein, and low-fat dairy products.  • Drink enough fluid to keep your urine pale yellow.  • Take vitamin and mineral supplements as recommended by your health care provider.  • Do not drink alcohol if your health care provider tells you not to drink.  • If you drink alcohol:  ? Limit how much you have to 0-2 drinks a day.  ? Know how much alcohol is in your drink. In the [place] one drink equals one 12 oz bottle of beer (355 mL), one 5 oz glass of wine (148 mL), or one 1½ oz glass of hard liquor (44 mL).  Lifestyle  • Brush your teeth every morning and night with fluoride toothpaste. Floss one time each day.  • Exercise for at least 30 minutes 5 or more days each week.  • Do not use any products that contain nicotine or tobacco. These products include cigarettes, chewing tobacco, and vaping devices, such as e-cigarettes. If you need help quitting, ask your health care provider.  • Do not use drugs.  • If you are sexually active, practice safe sex. Use a condom or other form of protection to prevent STIs.  • Find healthy ways to manage stress, such as:  ? Meditation, yoga, or listening to music.  ? Journaling.  ? Talking to a trusted person.  ? Spending time with friends and family.  • Minimize exposure to UV radiation to reduce your risk of skin cancer.  Safety  • Always wear your seat belt while driving or riding in a vehicle.  • Do not drive:  ? If you have been drinking alcohol. Do not ride with someone who has been drinking.  ? If you have been using any mind-altering substances or drugs.  ? While texting.  ? When you are tired or distracted.  • Wear a helmet and other protective equipment during sports activities.  • If you have firearms in your house, make sure you follow all gun safety procedures.  • Seek help if you have been physically or sexually abused.  What's next?  • Go to your health care provider once a year for an annual wellness visit.  • Ask your health care provider how often  you should have your eyes and teeth checked.  • Stay up to date on all vaccines.  This information is not intended to replace advice given to you by your health care provider. Make sure you discuss any questions you have with your health care provider.  Document Revised: 06/15/2022 Document Reviewed: 06/15/2022  [employer] Patient Education © 2022 [employer]        Counseling/Anticipatory Guidance:   Plan of care reviewed with patient at the conclusion of today's visit. Education was provided in regards to diagnosis, diet and exercise, prostate cancer screening discussed including benefit of early detection, potential need for follow-up, and harms associated with additional management. Patient agrees to screening.    Nutrition, physical activity, healthy weight,ways to reduce stress, adequate sleep, injury prevention, misuse of tobacco, alcohol and drugs, sexual behavior and STD's, dental health, mental health, and immunizations.    Plan of care reviewed with patient at the conclusion of today's visit. Education was provided regarding diagnosis, management and any prescribed or recommended OTC medications.  Patient verbalizes understanding of and agreement with management plan.    Return in about 3 months (around 7/12/2023) for Next scheduled follow up.    Dictated Utilizing Dragon Dictation      ELADIA Fischer     Transcribed from ambient dictation for ELADIA Fischer by Tatiana Zaldivar.  04/12/23   14:10 EDT    Patient or patient representative verbalized consent to the visit recording.  I have personally performed the services described in this document as transcribed by the above individual, and it is both accurate and complete.

## 2023-07-31 DIAGNOSIS — Z72.52 HIGH RISK HOMOSEXUAL BEHAVIOR: ICD-10-CM

## 2023-07-31 RX ORDER — EMTRICITABINE AND TENOFOVIR DISOPROXIL FUMARATE 200; 300 MG/1; MG/1
1 TABLET, FILM COATED ORAL DAILY
Qty: 90 TABLET | Refills: 0 | Status: SHIPPED | OUTPATIENT
Start: 2023-07-31

## 2023-08-30 ENCOUNTER — TELEPHONE (OUTPATIENT)
Dept: INTERNAL MEDICINE | Facility: CLINIC | Age: 23
End: 2023-08-30

## 2023-08-30 DIAGNOSIS — Z11.3 ROUTINE SCREENING FOR STI (SEXUALLY TRANSMITTED INFECTION): Primary | ICD-10-CM

## 2023-08-30 NOTE — TELEPHONE ENCOUNTER
"    Caller: Reg Nichols \"Will\"    Relationship: Self    Best call back number: [phone]    What orders are you requesting (i.e. lab or imaging): BLOOD WORK ORDER THAT HE NEEDS TO HIS MEDICATION PREP    Additional notes: THE PATIENT WOULD LIKE TO KNOW IF HE CAN HAVE THAT BLOOD WORK DONE PLEASE CALL PATIENT TO LET HIM KNOW IF ORDERS HAVE BEEN PLACED          " Appointment scheduled.

## 2023-09-07 ENCOUNTER — LAB (OUTPATIENT)
Dept: LAB | Facility: HOSPITAL | Age: 23
End: 2023-09-07
Payer: COMMERCIAL

## 2023-09-07 DIAGNOSIS — Z11.3 ROUTINE SCREENING FOR STI (SEXUALLY TRANSMITTED INFECTION): ICD-10-CM

## 2023-09-07 LAB
ALBUMIN SERPL-MCNC: 5.6 G/DL (ref 3.5–5.2)
ALBUMIN/GLOB SERPL: 2.5 G/DL
ALP SERPL-CCNC: 126 U/L (ref 39–117)
ALT SERPL W P-5'-P-CCNC: 22 U/L (ref 1–41)
ANION GAP SERPL CALCULATED.3IONS-SCNC: 12 MMOL/L (ref 5–15)
AST SERPL-CCNC: 21 U/L (ref 1–40)
BILIRUB SERPL-MCNC: 0.5 MG/DL (ref 0–1.2)
BUN SERPL-MCNC: 8 MG/DL (ref 6–20)
BUN/CREAT SERPL: 8.8 (ref 7–25)
CALCIUM SPEC-SCNC: 10 MG/DL (ref 8.6–10.5)
CHLORIDE SERPL-SCNC: 102 MMOL/L (ref 98–107)
CO2 SERPL-SCNC: 26 MMOL/L (ref 22–29)
CREAT SERPL-MCNC: 0.91 MG/DL (ref 0.76–1.27)
EGFRCR SERPLBLD CKD-EPI 2021: 122.2 ML/MIN/1.73
GLOBULIN UR ELPH-MCNC: 2.2 GM/DL
GLUCOSE SERPL-MCNC: 91 MG/DL (ref 65–99)
HIV 1+2 AB+HIV1 P24 AG SERPL QL IA: NORMAL
POTASSIUM SERPL-SCNC: 3.7 MMOL/L (ref 3.5–5.2)
PROT SERPL-MCNC: 7.8 G/DL (ref 6–8.5)
SODIUM SERPL-SCNC: 140 MMOL/L (ref 136–145)

## 2023-09-07 PROCEDURE — 87491 CHLMYD TRACH DNA AMP PROBE: CPT

## 2023-09-07 PROCEDURE — 86592 SYPHILIS TEST NON-TREP QUAL: CPT

## 2023-09-07 PROCEDURE — G0432 EIA HIV-1/HIV-2 SCREEN: HCPCS

## 2023-09-07 PROCEDURE — 87591 N.GONORRHOEAE DNA AMP PROB: CPT

## 2023-09-07 PROCEDURE — 80053 COMPREHEN METABOLIC PANEL: CPT

## 2023-09-08 LAB — RPR SER QL: NORMAL

## 2023-09-11 LAB
C TRACH RRNA SPEC QL NAA+PROBE: NEGATIVE
N GONORRHOEA RRNA SPEC QL NAA+PROBE: NEGATIVE

## 2023-11-01 ENCOUNTER — OFFICE VISIT (OUTPATIENT)
Dept: INTERNAL MEDICINE | Facility: CLINIC | Age: 23
End: 2023-11-01
Payer: COMMERCIAL

## 2023-11-01 VITALS
HEIGHT: 69 IN | HEART RATE: 84 BPM | TEMPERATURE: 97.3 F | DIASTOLIC BLOOD PRESSURE: 80 MMHG | BODY MASS INDEX: 17.63 KG/M2 | SYSTOLIC BLOOD PRESSURE: 132 MMHG | WEIGHT: 119 LBS

## 2023-11-01 DIAGNOSIS — Z29.81 ENCOUNTER FOR HIV PRE-EXPOSURE PROPHYLAXIS: Primary | ICD-10-CM

## 2023-11-01 RX ORDER — EMTRICITABINE AND TENOFOVIR DISOPROXIL FUMARATE 200; 300 MG/1; MG/1
1 TABLET, FILM COATED ORAL DAILY
Qty: 90 TABLET | Refills: 1 | Status: SHIPPED | OUTPATIENT
Start: 2023-11-01

## 2023-11-01 NOTE — PROGRESS NOTES
"Reg Nichols  2000  [id number]  Patient Care Team:  Carmen Arnold APRN as PCP - General (Internal Medicine)    Reg Nichols is a pleasant 23 y.o. male who presents for evaluation of No chief complaint on file.    No chief complaint on file.      HPI:   The patient is a 23-year-old male who presents to the clinic for a routine follow-up and Truvada refills.    The patient reports that he is doing well overall. 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 He also does [employer] work for 2 other [employer]. He works from home most of the time and works at the office occasionally. He has been eating better. He notes that he has gained 2 pounds since his last visit, but his weight fluctuates, he is consistently back in the 120 pounds range. He has been monitoring his weight more since his last visit. He has been eating high protein foods and drinking protein smoothies. He works out occasionally like doing cardio and stair climbing. He has been sleeping well.   Past Medical History:   Diagnosis Date    Allergic 2008    Anxiety 2014    Asthma 2008    Scoliosis 2015     Past Surgical History:   Procedure Laterality Date    APPENDECTOMY  2007    TONSILLECTOMY  2010     Family History   Adopted: Yes   Family history unknown: Yes     Social History     Tobacco Use   Smoking Status Never   Smokeless Tobacco Never     No Known Allergies    Current Outpatient Medications:     emtricitabine-tenofovir (Truvada) 200-300 MG per tablet, Take 1 tablet by mouth Daily., Disp: 90 tablet, Rfl: 1    Review of Systems  Review of systems was completed, and pertinent findings are noted in the HPI.  /80 (BP Location: Left arm, Patient Position: Sitting, Cuff Size: Adult)   Pulse 84   Temp 97.3 °F (36.3 °C) (Temporal)   Ht 174 cm (68.5\")   Wt 54 kg (119 lb)   BMI 17.83 kg/m²     Labs reviewed from 09/07/2023. Albumin was high. ALT and AST were normal. Kidney function, GFR, " creatinine, and BUN were normal.     Physical Exam    PHQ-9 Total Score:      Assessment/Plan:  1. Health maintenance  - The patient is doing well overall. He will continue taking his current medications.   - I place a future order for his screening labs for Truvada for 12/2023.   - He will return for follow up in 6 months and have screening labs every 3 months.       Diagnoses and all orders for this visit:    1. Encounter for HIV pre-exposure prophylaxis (Primary)  -     emtricitabine-tenofovir (Truvada) 200-300 MG per tablet; Take 1 tablet by mouth Daily.  Dispense: 90 tablet; Refill: 1    Other orders  -     Fluzone (or Fluarix & Flulaval for VFC) >6mos       There are no Patient Instructions on file for this visit.    Plan of care reviewed with patient at the conclusion of today's visit. Education was provided regarding diagnosis, management and any prescribed or recommended OTC medications.  Patient verbalizes understanding of and agreement with management plan.    No follow-ups on file.    Dictated Utilizing Dragon Dictation.    ELADIA Fischer    Transcribed from ambient dictation for ELADIA Fischer by Donnell Owusu.  11/01/23   17:47 EDT    Patient or patient representative verbalized consent to the visit recording.  I have personally performed the services described in this document as transcribed by the above individual, and it is both accurate and complete.

## 2024-02-04 DIAGNOSIS — Z29.81 ENCOUNTER FOR HIV PRE-EXPOSURE PROPHYLAXIS: ICD-10-CM

## 2024-02-05 NOTE — TELEPHONE ENCOUNTER
Rx Refill Note  Requested Prescriptions     Pending Prescriptions Disp Refills    emtricitabine-tenofovir (TRUVADA) 200-300 MG per tablet [Pharmacy Med Name: EMTRICITABINE/TENOF 200-300MG TABS] 90 tablet 1     Sig: TAKE 1 TABLET BY MOUTH DAILY      Last office visit with prescribing clinician: 11/1/2023   Last telemedicine visit with prescribing clinician: Visit date not found   Next office visit with prescribing clinician: 5/1/2024                         Would you like a call back once the refill request has been completed: [] Yes [] No    If the office needs to give you a call back, can they leave a voicemail: [] Yes [] No    Sharri Rios LPN  02/05/24, 10:22 EST

## 2024-02-06 DIAGNOSIS — Z11.3 ROUTINE SCREENING FOR STI (SEXUALLY TRANSMITTED INFECTION): Primary | ICD-10-CM

## 2024-02-06 RX ORDER — EMTRICITABINE AND TENOFOVIR DISOPROXIL FUMARATE 200; 300 MG/1; MG/1
1 TABLET, FILM COATED ORAL DAILY
Qty: 90 TABLET | Refills: 0 | Status: SHIPPED | OUTPATIENT
Start: 2024-02-06

## 2024-04-28 DIAGNOSIS — Z29.81 ENCOUNTER FOR HIV PRE-EXPOSURE PROPHYLAXIS: ICD-10-CM

## 2024-04-29 RX ORDER — EMTRICITABINE AND TENOFOVIR DISOPROXIL FUMARATE 200; 300 MG/1; MG/1
1 TABLET, FILM COATED ORAL DAILY
Qty: 90 TABLET | Refills: 0 | Status: SHIPPED | OUTPATIENT
Start: 2024-04-29

## 2024-04-29 NOTE — TELEPHONE ENCOUNTER
Pt notified and verbalized understanding   Urgent Care Discharge Call Back    Person Contacted: mother. Left message as below:    This is Dolores Dukes RN calling as a courtesy from [facility].    Alexia Valencia, DO wanted me to call you to see how you are doing as you were recently seen in Urgent Care.  You do not need to return this call, however, if you have any questions or concerns, please feel free to call us back at [phone].

## 2024-04-29 NOTE — TELEPHONE ENCOUNTER
Rx Refill Note  Requested Prescriptions     Pending Prescriptions Disp Refills    emtricitabine-tenofovir (TRUVADA) 200-300 MG per tablet [Pharmacy Med Name: EMTRICITABINE/TENOF 200-300MG TABS] 90 tablet 0     Sig: TAKE 1 TABLET BY MOUTH DAILY      Last office visit with prescribing clinician: 11/1/2023   Last telemedicine visit with prescribing clinician: Visit date not found   Next office visit with prescribing clinician: 5/1/2024                         Would you like a call back once the refill request has been completed: [] Yes [] No    If the office needs to give you a call back, can they leave a voicemail: [] Yes [] No    Sharri Rios LPN  04/29/24, 09:20 EDT

## 2024-05-01 ENCOUNTER — OFFICE VISIT (OUTPATIENT)
Dept: INTERNAL MEDICINE | Facility: CLINIC | Age: 24
End: 2024-05-01
Payer: COMMERCIAL

## 2024-05-01 VITALS
WEIGHT: 121 LBS | HEART RATE: 84 BPM | BODY MASS INDEX: 17.92 KG/M2 | SYSTOLIC BLOOD PRESSURE: 116 MMHG | HEIGHT: 69 IN | TEMPERATURE: 96.9 F | DIASTOLIC BLOOD PRESSURE: 70 MMHG

## 2024-05-01 DIAGNOSIS — Z13.220 LIPID SCREENING: ICD-10-CM

## 2024-05-01 DIAGNOSIS — Z00.00 ROUTINE MEDICAL EXAM: Primary | ICD-10-CM

## 2024-05-01 DIAGNOSIS — Z29.81 ENCOUNTER FOR HIV PRE-EXPOSURE PROPHYLAXIS: ICD-10-CM

## 2024-05-01 DIAGNOSIS — Z13.29 SCREENING FOR ENDOCRINE DISORDER: ICD-10-CM

## 2024-05-01 PROCEDURE — 99395 PREV VISIT EST AGE 18-39: CPT | Performed by: NURSE PRACTITIONER

## 2024-05-01 NOTE — PROGRESS NOTES
"Reg Nichols  2000  [id number]  Patient Care Team:  Carmen Arnold APRN as PCP - General (Internal Medicine)    Reg Nichols is a 24 y.o. pleasant female here today for annual physical.  Chief Complaint   Patient presents with    Annual Exam       HPI:   History of Present Illness  The patient is a 24-year-old male who presents for routine physical.    The patient has been diligently engaging in physical exercise at [employer] twice a week since 01/05/2024. This regimen comprises a combination of core and muscle group exercises, encompassing Pilates. His daily protein intake consists of 40 g of protein shakes, supplemented by meal kits, with a daily protein intake of 120 g.  He has gained 2 pounds and is happy with his progress.  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    He is on Truvada.  Will return for fasting labs. Uses condoms.  Recent new partner.       Past Medical History:   Diagnosis Date    Allergic 2008    Anxiety 2014    Asthma 2008    Scoliosis 2015     Past Surgical History:   Procedure Laterality Date    APPENDECTOMY  2007    TONSILLECTOMY  2010     Family History   Adopted: Yes   Family history unknown: Yes     Social History     Tobacco Use   Smoking Status Never   Smokeless Tobacco Never     No Known Allergies    Current Outpatient Medications:     emtricitabine-tenofovir (TRUVADA) 200-300 MG per tablet, TAKE 1 TABLET BY MOUTH DAILY, Disp: 90 tablet, Rfl: 0    Review of Systems    /70 (BP Location: Left arm, Patient Position: Sitting, Cuff Size: Adult)   Pulse 84   Temp 96.9 °F (36.1 °C) (Temporal)   Ht 174 cm (68.5\")   Wt 54.9 kg (121 lb)   BMI 18.13 kg/m²     Physical Exam  Vitals reviewed.   Constitutional:       Appearance: Normal appearance. He is well-developed and underweight.   HENT:      Head: Normocephalic.      Right Ear: External ear normal.      Left Ear: External ear normal.   Eyes:      Conjunctiva/sclera: " Conjunctivae normal.      Pupils: Pupils are equal, round, and reactive to light.   Cardiovascular:      Rate and Rhythm: Normal rate and regular rhythm.      Pulses: Normal pulses.      Heart sounds: Normal heart sounds.   Pulmonary:      Effort: Pulmonary effort is normal.      Breath sounds: Normal breath sounds.   Abdominal:      General: Abdomen is flat. Bowel sounds are normal.      Palpations: Abdomen is soft.   Musculoskeletal:         General: Normal range of motion.      Cervical back: Normal range of motion and neck supple.   Skin:     General: Skin is warm and dry.   Neurological:      Mental Status: He is alert and oriented to person, place, and time.   Psychiatric:         Mood and Affect: Mood normal.         Behavior: Behavior normal.         Thought Content: Thought content normal.         Judgment: Judgment normal.       Physical Exam      Results      PHQ-9 Total Score: 0         Assessment/Plan:  Assessment & Plan  1.  Underweight  Gaining some weight with increased protein and muscle building.  Continue current plan.    2.  PrEP: Continue Truvada and consistent condom use.    Patient Instructions   Preventive Care 21-39 Years Old, Male  Preventive care refers to lifestyle choices and visits with your health care provider that can promote health and wellness. Preventive care visits are also called wellness exams.  What can I expect for my preventive care visit?  Counseling  During your preventive care visit, your health care provider may ask about your:  Medical history, including:  Past medical problems.  Family medical history.  Current health, including:  Emotional well-being.  Home life and relationship well-being.  Sexual activity.  Lifestyle, including:  Alcohol, nicotine or tobacco, and drug use.  Access to firearms.  Diet, exercise, and sleep habits.  Safety issues such as seatbelt and bike helmet use.  Sunscreen use.  Work and work environment.  Physical exam  Your health care provider  may check your:  Height and weight. These may be used to calculate your BMI (body mass index). BMI is a measurement that tells if you are at a healthy weight.  Waist circumference. This measures the distance around your waistline. This measurement also tells if you are at a healthy weight and may help predict your risk of certain diseases, such as type 2 diabetes and high blood pressure.  Heart rate and blood pressure.  Body temperature.  Skin for abnormal spots.  What immunizations do I need?    Vaccines are usually given at various ages, according to a schedule. Your health care provider will recommend vaccines for you based on your age, medical history, and lifestyle or other factors, such as travel or where you work.  What tests do I need?  Screening  Your health care provider may recommend screening tests for certain conditions. This may include:  Lipid and cholesterol levels.  Diabetes screening. This is done by checking your blood sugar (glucose) after you have not eaten for a while (fasting).  Hepatitis B test.  Hepatitis C test.  HIV (human immunodeficiency virus) test.  STI (sexually transmitted infection) testing, if you are at risk.  Talk with your health care provider about your test results, treatment options, and if necessary, the need for more tests.  Follow these instructions at home:  Eating and drinking    Eat a healthy diet that includes fresh fruits and vegetables, whole grains, lean protein, and low-fat dairy products.  Drink enough fluid to keep your urine pale yellow.  Take vitamin and mineral supplements as recommended by your health care provider.  Do not drink alcohol if your health care provider tells you not to drink.  If you drink alcohol:  Limit how much you have to 0-2 drinks a day.  Know how much alcohol is in your drink. In the [place] one drink equals one 12 oz bottle of beer (355 mL), one 5 oz glass of wine (148 mL), or one 1½ oz glass of hard liquor (44 mL).  Lifestyle  Brush your  teeth every morning and night with fluoride toothpaste. Floss one time each day.  Exercise for at least 30 minutes 5 or more days each week.  Do not use any products that contain nicotine or tobacco. These products include cigarettes, chewing tobacco, and vaping devices, such as e-cigarettes. If you need help quitting, ask your health care provider.  Do not use drugs.  If you are sexually active, practice safe sex. Use a condom or other form of protection to prevent STIs.  Find healthy ways to manage stress, such as:  Meditation, yoga, or listening to music.  Journaling.  Talking to a trusted person.  Spending time with friends and family.  Minimize exposure to UV radiation to reduce your risk of skin cancer.  Safety  Always wear your seat belt while driving or riding in a vehicle.  Do not drive:  If you have been drinking alcohol. Do not ride with someone who has been drinking.  If you have been using any mind-altering substances or drugs.  While texting.  When you are tired or distracted.  Wear a helmet and other protective equipment during sports activities.  If you have firearms in your house, make sure you follow all gun safety procedures.  Seek help if you have been physically or sexually abused.  What's next?  Go to your health care provider once a year for an annual wellness visit.  Ask your health care provider how often you should have your eyes and teeth checked.  Stay up to date on all vaccines.  This information is not intended to replace advice given to you by your health care provider. Make sure you discuss any questions you have with your health care provider.  Document Revised: 06/15/2022 Document Reviewed: 06/15/2022  Elsevier Patient Education © 2024 [employer]      Counseling/Anticipatory Guidance:   Plan of care reviewed with patient at the conclusion of today's visit. Education was provided in regards to diagnosis, diet and exercise, cervical cancer screening, self breast exams, breast  cancer screening, and the importance of yearly mammograms.   Nutrition, family planning/contraception, physical activity, healthy weight,ways to reduce stress, adequate sleep, injury prevention, misuse of tobacco, alcohol and drugs, sexual behavior and STD's, dental health, mental health, and immunizations.    Management and any prescribed or recommended OTC medications.  Patient verbalizes understanding of and agreement with management plan.    Return in about 6 months (around 11/1/2024).    Dictated Utilizing Dragon Dictation.      ELADIA Fischer  Patient or patient representative verbalized consent for the use of Ambient Listening during the visit with  ELADIA Fischer for chart documentation. 5/1/2024  10:37 EDT

## 2024-05-01 NOTE — PATIENT INSTRUCTIONS
Preventive Care 21-39 Years Old, Male  Preventive care refers to lifestyle choices and visits with your health care provider that can promote health and wellness. Preventive care visits are also called wellness exams.  What can I expect for my preventive care visit?  Counseling  During your preventive care visit, your health care provider may ask about your:  Medical history, including:  Past medical problems.  Family medical history.  Current health, including:  Emotional well-being.  Home life and relationship well-being.  Sexual activity.  Lifestyle, including:  Alcohol, nicotine or tobacco, and drug use.  Access to firearms.  Diet, exercise, and sleep habits.  Safety issues such as seatbelt and bike helmet use.  Sunscreen use.  Work and work environment.  Physical exam  Your health care provider may check your:  Height and weight. These may be used to calculate your BMI (body mass index). BMI is a measurement that tells if you are at a healthy weight.  Waist circumference. This measures the distance around your waistline. This measurement also tells if you are at a healthy weight and may help predict your risk of certain diseases, such as type 2 diabetes and high blood pressure.  Heart rate and blood pressure.  Body temperature.  Skin for abnormal spots.  What immunizations do I need?    Vaccines are usually given at various ages, according to a schedule. Your health care provider will recommend vaccines for you based on your age, medical history, and lifestyle or other factors, such as travel or where you work.  What tests do I need?  Screening  Your health care provider may recommend screening tests for certain conditions. This may include:  Lipid and cholesterol levels.  Diabetes screening. This is done by checking your blood sugar (glucose) after you have not eaten for a while (fasting).  Hepatitis B test.  Hepatitis C test.  HIV (human immunodeficiency virus) test.  STI (sexually transmitted infection)  testing, if you are at risk.  Talk with your health care provider about your test results, treatment options, and if necessary, the need for more tests.  Follow these instructions at home:  Eating and drinking    Eat a healthy diet that includes fresh fruits and vegetables, whole grains, lean protein, and low-fat dairy products.  Drink enough fluid to keep your urine pale yellow.  Take vitamin and mineral supplements as recommended by your health care provider.  Do not drink alcohol if your health care provider tells you not to drink.  If you drink alcohol:  Limit how much you have to 0-2 drinks a day.  Know how much alcohol is in your drink. In the [place] one drink equals one 12 oz bottle of beer (355 mL), one 5 oz glass of wine (148 mL), or one 1½ oz glass of hard liquor (44 mL).  Lifestyle  Brush your teeth every morning and night with fluoride toothpaste. Floss one time each day.  Exercise for at least 30 minutes 5 or more days each week.  Do not use any products that contain nicotine or tobacco. These products include cigarettes, chewing tobacco, and vaping devices, such as e-cigarettes. If you need help quitting, ask your health care provider.  Do not use drugs.  If you are sexually active, practice safe sex. Use a condom or other form of protection to prevent STIs.  Find healthy ways to manage stress, such as:  Meditation, yoga, or listening to music.  Journaling.  Talking to a trusted person.  Spending time with friends and family.  Minimize exposure to UV radiation to reduce your risk of skin cancer.  Safety  Always wear your seat belt while driving or riding in a vehicle.  Do not drive:  If you have been drinking alcohol. Do not ride with someone who has been drinking.  If you have been using any mind-altering substances or drugs.  While texting.  When you are tired or distracted.  Wear a helmet and other protective equipment during sports activities.  If you have firearms in your house, make sure you  follow all gun safety procedures.  Seek help if you have been physically or sexually abused.  What's next?  Go to your health care provider once a year for an annual wellness visit.  Ask your health care provider how often you should have your eyes and teeth checked.  Stay up to date on all vaccines.  This information is not intended to replace advice given to you by your health care provider. Make sure you discuss any questions you have with your health care provider.  Document Revised: 06/15/2022 Document Reviewed: 06/15/2022  [employer] Patient Education © 2024 [employer] Inc[employer]

## 2024-06-13 ENCOUNTER — LAB (OUTPATIENT)
Dept: LAB | Facility: HOSPITAL | Age: 24
End: 2024-06-13
Payer: COMMERCIAL

## 2024-06-13 DIAGNOSIS — Z11.3 ROUTINE SCREENING FOR STI (SEXUALLY TRANSMITTED INFECTION): ICD-10-CM

## 2024-06-13 DIAGNOSIS — Z13.29 SCREENING FOR ENDOCRINE DISORDER: ICD-10-CM

## 2024-06-13 DIAGNOSIS — Z13.220 LIPID SCREENING: ICD-10-CM

## 2024-06-13 LAB
ALBUMIN SERPL-MCNC: 5 G/DL (ref 3.5–5.2)
ALBUMIN/GLOB SERPL: 1.9 G/DL
ALP SERPL-CCNC: 125 U/L (ref 39–117)
ALT SERPL W P-5'-P-CCNC: 32 U/L (ref 1–41)
ANION GAP SERPL CALCULATED.3IONS-SCNC: 12.5 MMOL/L (ref 5–15)
AST SERPL-CCNC: 33 U/L (ref 1–40)
BASOPHILS # BLD AUTO: 0.01 10*3/MM3 (ref 0–0.2)
BASOPHILS NFR BLD AUTO: 0.2 % (ref 0–1.5)
BILIRUB SERPL-MCNC: 0.8 MG/DL (ref 0–1.2)
BUN SERPL-MCNC: 9 MG/DL (ref 6–20)
BUN/CREAT SERPL: 11.7 (ref 7–25)
CALCIUM SPEC-SCNC: 9.8 MG/DL (ref 8.6–10.5)
CHLORIDE SERPL-SCNC: 104 MMOL/L (ref 98–107)
CHOLEST SERPL-MCNC: 143 MG/DL (ref 0–200)
CO2 SERPL-SCNC: 24.5 MMOL/L (ref 22–29)
CREAT SERPL-MCNC: 0.77 MG/DL (ref 0.76–1.27)
DEPRECATED RDW RBC AUTO: 42.3 FL (ref 37–54)
EGFRCR SERPLBLD CKD-EPI 2021: 129 ML/MIN/1.73
EOSINOPHIL # BLD AUTO: 0.04 10*3/MM3 (ref 0–0.4)
EOSINOPHIL NFR BLD AUTO: 0.9 % (ref 0.3–6.2)
ERYTHROCYTE [DISTWIDTH] IN BLOOD BY AUTOMATED COUNT: 12 % (ref 12.3–15.4)
GLOBULIN UR ELPH-MCNC: 2.7 GM/DL
GLUCOSE SERPL-MCNC: 90 MG/DL (ref 65–99)
HCT VFR BLD AUTO: 44.7 % (ref 37.5–51)
HDLC SERPL-MCNC: 39 MG/DL (ref 40–60)
HGB BLD-MCNC: 15.4 G/DL (ref 13–17.7)
HIV 1+2 AB+HIV1 P24 AG SERPL QL IA: NORMAL
IMM GRANULOCYTES # BLD AUTO: 0.02 10*3/MM3 (ref 0–0.05)
IMM GRANULOCYTES NFR BLD AUTO: 0.5 % (ref 0–0.5)
LDLC SERPL CALC-MCNC: 93 MG/DL (ref 0–100)
LDLC/HDLC SERPL: 2.42 {RATIO}
LYMPHOCYTES # BLD AUTO: 1.61 10*3/MM3 (ref 0.7–3.1)
LYMPHOCYTES NFR BLD AUTO: 37.2 % (ref 19.6–45.3)
MCH RBC QN AUTO: 33.3 PG (ref 26.6–33)
MCHC RBC AUTO-ENTMCNC: 34.5 G/DL (ref 31.5–35.7)
MCV RBC AUTO: 96.8 FL (ref 79–97)
MONOCYTES # BLD AUTO: 0.46 10*3/MM3 (ref 0.1–0.9)
MONOCYTES NFR BLD AUTO: 10.6 % (ref 5–12)
NEUTROPHILS NFR BLD AUTO: 2.19 10*3/MM3 (ref 1.7–7)
NEUTROPHILS NFR BLD AUTO: 50.6 % (ref 42.7–76)
NRBC BLD AUTO-RTO: 0 /100 WBC (ref 0–0.2)
PLATELET # BLD AUTO: 207 10*3/MM3 (ref 140–450)
PMV BLD AUTO: 9.1 FL (ref 6–12)
POTASSIUM SERPL-SCNC: 3.6 MMOL/L (ref 3.5–5.2)
PROT SERPL-MCNC: 7.7 G/DL (ref 6–8.5)
RBC # BLD AUTO: 4.62 10*6/MM3 (ref 4.14–5.8)
SODIUM SERPL-SCNC: 141 MMOL/L (ref 136–145)
TRIGL SERPL-MCNC: 49 MG/DL (ref 0–150)
TSH SERPL DL<=0.05 MIU/L-ACNC: 1.36 UIU/ML (ref 0.27–4.2)
VLDLC SERPL-MCNC: 11 MG/DL (ref 5–40)
WBC NRBC COR # BLD AUTO: 4.33 10*3/MM3 (ref 3.4–10.8)

## 2024-06-13 PROCEDURE — 80050 GENERAL HEALTH PANEL: CPT

## 2024-06-13 PROCEDURE — G0432 EIA HIV-1/HIV-2 SCREEN: HCPCS

## 2024-06-13 PROCEDURE — 86592 SYPHILIS TEST NON-TREP QUAL: CPT

## 2024-06-13 PROCEDURE — 80061 LIPID PANEL: CPT

## 2024-06-13 PROCEDURE — 87591 N.GONORRHOEAE DNA AMP PROB: CPT

## 2024-06-13 PROCEDURE — 87491 CHLMYD TRACH DNA AMP PROBE: CPT

## 2024-06-14 LAB — RPR SER QL: NORMAL

## 2024-06-17 LAB
C TRACH RRNA SPEC QL NAA+PROBE: NEGATIVE
N GONORRHOEA RRNA SPEC QL NAA+PROBE: NEGATIVE

## 2024-08-02 DIAGNOSIS — Z29.81 ENCOUNTER FOR HIV PRE-EXPOSURE PROPHYLAXIS: ICD-10-CM

## 2024-08-02 RX ORDER — EMTRICITABINE AND TENOFOVIR DISOPROXIL FUMARATE 200; 300 MG/1; MG/1
1 TABLET, FILM COATED ORAL DAILY
Qty: 90 TABLET | Refills: 0 | Status: SHIPPED | OUTPATIENT
Start: 2024-08-02

## 2024-08-02 NOTE — TELEPHONE ENCOUNTER
Rx Refill Note  Requested Prescriptions     Pending Prescriptions Disp Refills    emtricitabine-tenofovir (TRUVADA) 200-300 MG per tablet [Pharmacy Med Name: EMTRICITABINE/TENOF 200-300MG TABS] 90 tablet 0     Sig: TAKE 1 TABLET BY MOUTH DAILY      Last office visit with prescribing clinician: 5/1/2024   Last telemedicine visit with prescribing clinician: Visit date not found   Next office visit with prescribing clinician: 11/6/2024                         Would you like a call back once the refill request has been completed: [] Yes [] No    If the office needs to give you a call back, can they leave a voicemail: [] Yes [] No    Sharri Rios LPN  08/02/24, 08:40 EDT

## 2024-11-06 ENCOUNTER — OFFICE VISIT (OUTPATIENT)
Dept: INTERNAL MEDICINE | Facility: CLINIC | Age: 24
End: 2024-11-06
Payer: COMMERCIAL

## 2024-11-06 VITALS
DIASTOLIC BLOOD PRESSURE: 80 MMHG | TEMPERATURE: 98 F | HEART RATE: 83 BPM | SYSTOLIC BLOOD PRESSURE: 120 MMHG | HEIGHT: 69 IN | OXYGEN SATURATION: 98 % | WEIGHT: 132 LBS | BODY MASS INDEX: 19.55 KG/M2

## 2024-11-06 DIAGNOSIS — Z72.52 HIGH RISK HOMOSEXUAL BEHAVIOR: Primary | ICD-10-CM

## 2024-11-06 NOTE — PROGRESS NOTES
"Reg Nichols  2000  [id number]  Patient Care Team:  Carmen Arnold APRN as PCP - General (Internal Medicine)    Reg Nichols is a pleasant 24 y.o. male who presents for evaluation of Labs Only    Chief Complaint   Patient presents with    Labs Only       HPI:   History of Present Illness  Will is a 23-year-old male who presents to the clinic for a routine follow-up and Truvada refills.     He reports feeling well overall and maintains a regular exercise routine, working out twice weekly. He has gained weight and currently weighs 133 pounds, which he attributes to a high-protein diet.    He is on Truvada medication and reports no issues with it. He mentions having a single sexual partner, but they have not been sexually active for the past 5 months due to living apart.    He recently had a dermatology appointment for a long-standing black line under his right thumbnail, which was deemed non-concerning. It has been present for about 8 years. A recheck is scheduled for a year from now.     Results      Past Medical History:   Diagnosis Date    Allergic 2008    Anxiety 2014    Asthma 2008    Scoliosis 2015     Past Surgical History:   Procedure Laterality Date    APPENDECTOMY  2007    TONSILLECTOMY  2010     Family History   Adopted: Yes   Family history unknown: Yes     Social History     Tobacco Use   Smoking Status Never   Smokeless Tobacco Never     No Known Allergies    Current Outpatient Medications:     emtricitabine-tenofovir (TRUVADA) 200-300 MG per tablet, TAKE 1 TABLET BY MOUTH DAILY, Disp: 90 tablet, Rfl: 0    Review of Systems  Review of systems was completed, and pertinent findings are noted in the HPI.  /80 (BP Location: Left arm, Patient Position: Sitting, Cuff Size: Adult)   Pulse 83   Temp 98 °F (36.7 °C) (Infrared)   Ht 174 cm (68.5\")   Wt 59.9 kg (132 lb)   SpO2 98%   BMI 19.78 kg/m²     Physical Exam  Vitals reviewed.   Constitutional:       Appearance: He is " well-developed.   Pulmonary:      Effort: Pulmonary effort is normal.   Skin:     General: Skin is warm and dry.   Neurological:      Mental Status: He is alert.   Psychiatric:         Behavior: Behavior normal.         PHQ-9 Total Score:      Assessment/Plan:  Assessment & Plan    1. PrEP    He will continue taking Truvada as prescribed.    2. Health Maintenance.  Influenza and tetanus vaccines will be administered today. A COVID-19 vaccine was recommended, which he can obtain from any pharmacy. Routine labs have been ordered, including STI screening and cholesterol panel.    There are no Patient Instructions on file for this visit.  Plan of care reviewed with patient at the conclusion of today's visit. Education was provided regarding diagnosis, management and any prescribed or recommended OTC medications.  Patient verbalizes understanding of and agreement with management plan.    No follow-ups on file.    Dictated Utilizing Dragon Dictation.    ELADIA Fischer       Patient or patient representative verbalized consent for the use of Ambient Listening during the visit with  ELADIA Fischer for chart documentation. 11/7/2024  20:54 EST     UE

## 2024-11-06 NOTE — LETTER
[facility]  Vaccine Consent Form    Patient Name:  Reg Nichols  Patient :  2000  MRN: [id number]     Vaccine(s) Ordered    Fluzone >6mos  Td Vaccine => 6yo PF (TDVAX) 2-2        Screening Checklist  The following questions should be completed prior to vaccination. If you answer “yes” to any question, it does not necessarily mean you should not be vaccinated. It just means we may need to clarify or ask more questions. If a question is unclear, please ask your healthcare provider to explain it.    Yes No   Any fever or moderate to severe illness today (mild illness and/or antibiotic treatment are not contraindications)?     Do you have a history of a serious reaction to any previous vaccinations, such as anaphylaxis, encephalopathy within 7 days, Guillain-Sanders syndrome within 6 weeks, seizure?     Have you received any live vaccine(s) (e.g MMR, SLIME) or any other vaccines in the last month (to ensure duplicate doses aren't given)?     Do you have an anaphylactic allergy to latex (DTaP, DTaP-IPV, Hep A, Hep B, MenB, RV, Td, Tdap), baker’s yeast (Hep B, HPV), polysorbates (RSV, nirsevimab, PCV 20, Rotavirrus, Tdap, Shingrix), or gelatin (SLIME, MMR)?     Do you have an anaphylactic allergy to neomycin (Rabies, SLIME, MMR, IPV, Hep A), polymyxin B (IPV), or streptomycin (IPV)?      Any cancer, leukemia, AIDS, or other immune system disorder? (SLIME, MMR, RV)     Do you have a parent, brother, or sister with an immune system problem (if immune competence of vaccine recipient clinically verified, can proceed)? (MMR, SLIME)     Any recent steroid treatments for >2 weeks, chemotherapy, or radiation treatment? (SLIME, MMR)     Have you received antibody-containing blood transfusions or IVIG in the past 11 months (recommended interval is dependent on product)? (MMR, SLIME)     Have you taken antiviral drugs (acyclovir, famciclovir, valacyclovir for SLIME) in the last 24 or 48 hours, respectively?      Are you pregnant or  "planning to become pregnant within 1 month? (SLIME, MMR, HPV, IPV, MenB, Abrexvy; For Hep B- refer to Engerix-B; For RSV - Abrysvo is indicated for 32-36 weeks of pregnancy from September to January)     For infants, have you ever been told your child has had intussusception or a medical emergency involving obstruction of the intestine (Rotavirus)? If not for an infant, can skip this question.         *Ordering Physicians/APC should be consulted if \"yes\" is checked by the patient or guardian above.  I have received, read, and understand the Vaccine Information Statement (VIS) for each vaccine ordered.  I have considered my or my child's health status as well as the health status of my close contacts.  I have taken the opportunity to discuss my vaccine questions with my or my child's health care provider.   I have requested that the ordered vaccine(s) be given to me or my child.  I understand the benefits and risks of the vaccines.  I understand that I should remain in the clinic for 15 minutes after receiving the vaccine(s).  _________________________________________________________  Signature of Patient or Parent/Legal Guardian ____________________  Date     "

## 2024-11-08 DIAGNOSIS — Z29.81 ENCOUNTER FOR HIV PRE-EXPOSURE PROPHYLAXIS: ICD-10-CM

## 2024-11-08 NOTE — TELEPHONE ENCOUNTER
Rx Refill Note  Requested Prescriptions     Pending Prescriptions Disp Refills    emtricitabine-tenofovir (TRUVADA) 200-300 MG per tablet [Pharmacy Med Name: EMTRICITABINE/TENOF 200-300MG TABS] 90 tablet 0     Sig: TAKE 1 TABLET BY MOUTH DAILY      Last office visit with prescribing clinician: 11/6/2024   Last telemedicine visit with prescribing clinician: Visit date not found   Next office visit with prescribing clinician: Visit date not found                         Would you like a call back once the refill request has been completed: [] Yes [] No    If the office needs to give you a call back, can they leave a voicemail: [] Yes [] No    Sharri Rios LPN  11/08/24, 08:21 EST

## 2024-11-09 RX ORDER — EMTRICITABINE AND TENOFOVIR DISOPROXIL FUMARATE 200; 300 MG/1; MG/1
1 TABLET, FILM COATED ORAL DAILY
Qty: 90 TABLET | Refills: 0 | Status: SHIPPED | OUTPATIENT
Start: 2024-11-09

## 2024-12-17 ENCOUNTER — OFFICE VISIT (OUTPATIENT)
Dept: INTERNAL MEDICINE | Facility: CLINIC | Age: 24
End: 2024-12-17
Payer: COMMERCIAL

## 2024-12-17 VITALS
HEIGHT: 69 IN | SYSTOLIC BLOOD PRESSURE: 126 MMHG | DIASTOLIC BLOOD PRESSURE: 70 MMHG | HEART RATE: 84 BPM | WEIGHT: 129 LBS | TEMPERATURE: 98.7 F | BODY MASS INDEX: 19.11 KG/M2

## 2024-12-17 DIAGNOSIS — M79.602 MUSCULOSKELETAL ARM PAIN, LEFT: Primary | ICD-10-CM

## 2024-12-17 DIAGNOSIS — R20.0 NUMBNESS AND TINGLING: ICD-10-CM

## 2024-12-17 DIAGNOSIS — R20.2 NUMBNESS AND TINGLING: ICD-10-CM

## 2024-12-17 PROCEDURE — 99214 OFFICE O/P EST MOD 30 MIN: CPT

## 2024-12-17 RX ORDER — CYCLOBENZAPRINE HCL 5 MG
5 TABLET ORAL NIGHTLY PRN
Qty: 14 TABLET | Refills: 0 | Status: SHIPPED | OUTPATIENT
Start: 2024-12-17

## 2024-12-17 RX ORDER — IBUPROFEN 800 MG/1
800 TABLET, FILM COATED ORAL 3 TIMES DAILY
Qty: 42 TABLET | Refills: 0 | Status: SHIPPED | OUTPATIENT
Start: 2024-12-17 | End: 2024-12-31

## 2024-12-17 NOTE — PROGRESS NOTES
Acute Office Visit      Name: Reg Nichols    : 2000     MRN: [id number]   Care Team: Patient Care Team:  Carmen Arnold APRN as PCP - General (Internal Medicine)    Chief Complaint  Arm Pain (Left upper arm pain )    Subjective     History of Present Illness:    Darien is a pleasant 24-year-old male who comes to the office today for left upper extremity pain, numbness, and tingling.    He reports that these symptoms started approximately 1 week ago.  He denies any recent trauma, falls, gym exercises.  He notes that he does sleep primarily on his sides, back and forth between the left and the right.    Darien describes his symptoms as a soreness with sharp, stabbing, intermittent pain that starts in his left shoulder/upper arm and proceeds to right above the elbow.  He also experiences some occasional numbness and tingling in that area.  He denies any numbness or tingling at his fingers on the left hand.      He denies any previous injury to his neck or back.  He denies any other symptoms or concerns at this time.  He states that he has just been attempting to rest his arm as much as possible along with an occasional ibuprofen.    Past Medical History:   Diagnosis Date    Allergic 2008    Anxiety 2014    Asthma 2008    Scoliosis 2015       Past Surgical History:   Procedure Laterality Date    APPENDECTOMY  2007    TONSILLECTOMY  2010       Social History     Socioeconomic History    Marital status: Single   Tobacco Use    Smoking status: Never    Smokeless tobacco: Never   Vaping Use    Vaping status: Never Used   Substance and Sexual Activity    Alcohol use: Yes     Alcohol/week: 1.0 - 2.0 standard drink of alcohol     Types: 1 - 2 Drinks containing 0.5 oz of alcohol per week     Comment: Only drink for occasions so not every week    Drug use: Never    Sexual activity: Yes     Partners: Male     Birth control/protection: Condom, Partner of same sex, Pill         Current Outpatient Medications:      "emtricitabine-tenofovir (TRUVADA) 200-300 MG per tablet, TAKE 1 TABLET BY MOUTH DAILY, Disp: 90 tablet, Rfl: 0    cyclobenzaprine (FLEXERIL) 5 MG tablet, Take 1 tablet by mouth At Night As Needed for Muscle Spasms., Disp: 14 tablet, Rfl: 0    ibuprofen (ADVIL,MOTRIN) 800 MG tablet, Take 1 tablet by mouth 3 times a day for 14 days., Disp: 42 tablet, Rfl: 0    Procedures    PHQ-9 Total Score:      Objective     Vital Signs  /70 (BP Location: Right arm, Patient Position: Sitting, Cuff Size: Adult)   Pulse 84   Temp 98.7 °F (37.1 °C) (Temporal)   Ht 174 cm (68.5\")   Wt 58.5 kg (129 lb)   BMI 19.33 kg/m²   Estimated body mass index is 19.33 kg/m² as calculated from the following:    Height as of this encounter: 174 cm (68.5\").    Weight as of this encounter: 58.5 kg (129 lb).    BMI is within normal parameters. No other follow-up for BMI required.      Physical Exam  Vitals and nursing note reviewed.   HENT:      Head: Normocephalic.   Musculoskeletal:      Right shoulder: Normal.      Left shoulder: Tenderness present. Decreased range of motion. Decreased strength.      Right upper arm: Normal.      Left upper arm: Tenderness present.   Skin:     General: Skin is warm and dry.   Neurological:      General: No focal deficit present.      Mental Status: He is alert and oriented to person, place, and time.   Psychiatric:         Mood and Affect: Mood normal.         Behavior: Behavior normal.         Thought Content: Thought content normal.         Judgment: Judgment normal.        Assessment and Plan     Assessment/Plan:  Diagnoses and all orders for this visit:    1. Musculoskeletal arm pain, left (Primary)  -     ibuprofen (ADVIL,MOTRIN) 800 MG tablet; Take 1 tablet by mouth 3 times a day for 14 days.  Dispense: 42 tablet; Refill: 0  -     cyclobenzaprine (FLEXERIL) 5 MG tablet; Take 1 tablet by mouth At Night As Needed for Muscle Spasms.  Dispense: 14 tablet; Refill: 0  -Start ibuprofen 800 mg 3 times daily " x 14 days.-  -Start cyclobenzaprine 5 mg nightly x 14 days.  -Rest neck/back/bilateral upper extremities x 14 days.  -May use ice or moist heat as needed for pain or discomfort.  -If symptoms remain or worsen, notify provider.  -Will order physical therapy if pain or discomfort continues.    2. Numbness and tingling  -     ibuprofen (ADVIL,MOTRIN) 800 MG tablet; Take 1 tablet by mouth 3 times a day for 14 days.  Dispense: 42 tablet; Refill: 0  -     cyclobenzaprine (FLEXERIL) 5 MG tablet; Take 1 tablet by mouth At Night As Needed for Muscle Spasms.  Dispense: 14 tablet; Refill: 0       There are no Patient Instructions on file for this visit.  Plan of care reviewed with patient at the conclusion of today's visit. Education was provided regarding diagnosis, management and any prescribed or recommended OTC medications.  Patient verbalizes understanding of and agreement with management plan.    Follow Up  Return for Next Scheduled Follow up.    ELADIA Medellin

## 2025-01-22 ENCOUNTER — OFFICE VISIT (OUTPATIENT)
Dept: INTERNAL MEDICINE | Facility: CLINIC | Age: 25
End: 2025-01-22
Payer: COMMERCIAL

## 2025-01-22 ENCOUNTER — HOSPITAL ENCOUNTER (OUTPATIENT)
Dept: GENERAL RADIOLOGY | Facility: HOSPITAL | Age: 25
Discharge: HOME | End: 2025-01-22
Admitting: NURSE PRACTITIONER
Payer: COMMERCIAL

## 2025-01-22 VITALS
SYSTOLIC BLOOD PRESSURE: 124 MMHG | DIASTOLIC BLOOD PRESSURE: 80 MMHG | HEIGHT: 69 IN | TEMPERATURE: 98.4 F | OXYGEN SATURATION: 98 % | WEIGHT: 128 LBS | BODY MASS INDEX: 18.96 KG/M2 | HEART RATE: 90 BPM

## 2025-01-22 DIAGNOSIS — K62.89 RECTAL PAIN: Primary | ICD-10-CM

## 2025-01-22 DIAGNOSIS — K62.89 RECTAL PAIN: ICD-10-CM

## 2025-01-22 DIAGNOSIS — K59.00 CONSTIPATION, UNSPECIFIED CONSTIPATION TYPE: ICD-10-CM

## 2025-01-22 PROCEDURE — 99214 OFFICE O/P EST MOD 30 MIN: CPT | Performed by: NURSE PRACTITIONER

## 2025-01-22 PROCEDURE — 74018 RADEX ABDOMEN 1 VIEW: CPT

## 2025-01-22 NOTE — PROGRESS NOTES
Reg Nichols  2000  [id number]  Patient Care Team:  Carmen Arnold APRN as PCP - General (Internal Medicine)    Reg Nichols is a pleasant 24 y.o. male who presents for evaluation of Abdominal Pain (x1wk)    Chief Complaint   Patient presents with    Abdominal Pain     x1wk       HPI:   History of Present Illness  Will is a 24-year-old male who presents for evaluation of acute abdominal pain.    He reports experiencing perineal and rectal discomfort, which he describes as a sensation of pressure rather than pain. This discomfort began on the previous Monday and was initially attributed to constipation. Despite the use of fiber supplement and to doses of MiraLAX (not consecutive), which provided some relief, he continues to experience a sensation of pressure and swelling, leading him to suspect an internal  hemorrhoid. He recalls a single episode of bright red blood on the toilet paper, which occurred during a period of constipation and straining. His stools have since softened, but he continues to experience difficulty with bowel movements. He reports no diarrhea or abdominal pain. He also reports no urinary symptoms such as dysuria, dark or discolored urine, or penile discharge.  No recent penetrative sexual activity. He rates his discomfort as less than 5 on a scale of 0 to 10, noting that it is most pronounced during bowel movements. He has a history of constipation lasting 2 to 3 days, but this is the first time he has experienced such prolonged symptoms. He reports no fever, nausea, or vomiting. He describes his symptoms as fluctuating, with some days being better than others. He has noticed mucus in his stool a few times, but reports no diarrhea. He has been taking ibuprofen and fiber supplements, which he started after his first episode of constipation. HHis last bowel movement was the previous night. He has not used any treatments for hemorrhoids as he has not had issues with them in the  "past.    Results      Past Medical History:   Diagnosis Date    Allergic 2008    Anxiety 2014    Asthma 2008    Scoliosis 2015     Past Surgical History:   Procedure Laterality Date    APPENDECTOMY  2007    TONSILLECTOMY  2010     Family History   Adopted: Yes   Family history unknown: Yes     Social History     Tobacco Use   Smoking Status Never   Smokeless Tobacco Never     No Known Allergies    Current Outpatient Medications:     cyclobenzaprine (FLEXERIL) 5 MG tablet, Take 1 tablet by mouth At Night As Needed for Muscle Spasms., Disp: 14 tablet, Rfl: 0    emtricitabine-tenofovir (TRUVADA) 200-300 MG per tablet, TAKE 1 TABLET BY MOUTH DAILY, Disp: 90 tablet, Rfl: 0    Review of Systems  Review of systems was completed, and pertinent findings are noted in the HPI.  /80 (BP Location: Right arm, Patient Position: Sitting, Cuff Size: Adult)   Pulse 90   Temp 98.4 °F (36.9 °C) (Infrared)   Ht 174 cm (68.5\")   Wt 58.1 kg (128 lb)   SpO2 98%   BMI 19.18 kg/m²     Physical Exam  Vitals reviewed.   Constitutional:       Appearance: Normal appearance. He is well-developed.   HENT:      Head: Normocephalic and atraumatic.      Right Ear: External ear normal.      Left Ear: External ear normal.   Eyes:      Conjunctiva/sclera: Conjunctivae normal.      Pupils: Pupils are equal, round, and reactive to light.   Cardiovascular:      Rate and Rhythm: Normal rate and regular rhythm.      Pulses: Normal pulses.      Heart sounds: Normal heart sounds.   Pulmonary:      Effort: Pulmonary effort is normal.      Breath sounds: Normal breath sounds.   Abdominal:      General: Abdomen is flat. Bowel sounds are normal.      Palpations: Abdomen is soft.   Genitourinary:     Rectum: No external hemorrhoid.      Comments: No internal hemorrhoid appreciated on palpation  Musculoskeletal:         General: Normal range of motion.      Cervical back: Normal range of motion and neck supple.   Skin:     General: Skin is warm and dry. "   Neurological:      Mental Status: He is alert and oriented to person, place, and time.   Psychiatric:         Mood and Affect: Mood normal.         Behavior: Behavior normal.         Thought Content: Thought content normal.         Judgment: Judgment normal.         PHQ-9 Total Score:      Assessment/Plan:  Assessment & Plan  1.  Rectal pressure  An abdominal x-ray will be ordered to assess for retained stool. He is advised to take MiraLAX daily for 3 to 4 days unless diarrhea occurs. If diarrhea starts, he should reduce the dosage but not discontinue it completely. He is instructed to temporarily discontinue fiber supplements until constipation is resolved. Over-the-counter hemorrhoid creams such as Preparation H can be used for symptomatic relief. If symptoms worsen, a referral to a colorectal specialist will be considered. He is advised to avoid constipation to prevent future flare-ups.    2. Constipation.  Use MiraLAX short-term to resolve constipation, then resume psyllium based fiber supplement.  Increase water intake, goal 80 ounces per day, and regular physical activity.    There are no Patient Instructions on file for this visit.  Plan of care reviewed with patient at the conclusion of today's visit. Education was provided regarding diagnosis, management and any prescribed or recommended OTC medications.  Patient verbalizes understanding of and agreement with management plan.    No follow-ups on file.    Dictated Utilizing Dragon Dictation.    ELADIA Fischer       Patient or patient representative verbalized consent for the use of Ambient Listening during the visit with  ELADIA Fischer for chart documentation. 1/22/2025  08:04 EST    Answers submitted by the patient for this visit:  Abdominal Pain Questionnaire (Submitted on 1/21/2025)  Chief Complaint: Abdominal pain  Chronicity: new  Onset: in the past 7 days  Onset quality: gradual  Frequency: 2 to 4 times per day  Episode duration: 1  Hours  Progression since onset: coming and going  Pain location: rectum  Pain - numeric: 4/10  Pain quality: cramping  Radiates to: perineum  anorexia: No  belching: No  flatus: No  hematochezia: No  melena: No  Aggravated by: bowel movement  Relieved by: activity, movement, palpation, passing flatus, sitting up, standing

## 2025-01-23 ENCOUNTER — TELEPHONE (OUTPATIENT)
Dept: INTERNAL MEDICINE | Facility: CLINIC | Age: 25
End: 2025-01-23
Payer: COMMERCIAL

## 2025-01-23 NOTE — TELEPHONE ENCOUNTER
"Caller: Reg Nichols \"Will\"    Relationship: Self    Best call back number: [phone]     Caller requesting test results: PATIENT    What test was performed: XRAYS    When was the test performed: 01/22/25    Where was the test performed: [facility]      "

## 2025-01-23 NOTE — TELEPHONE ENCOUNTER
Spoke with pt to let him know that his results have not been released yet. He verbalized understanding.

## 2025-01-24 ENCOUNTER — TELEPHONE (OUTPATIENT)
Dept: INTERNAL MEDICINE | Facility: CLINIC | Age: 25
End: 2025-01-24
Payer: COMMERCIAL

## 2025-01-24 NOTE — TELEPHONE ENCOUNTER
Let Will know he did have quite a bit of stool still in colon so that our helps confirm suspicion that constipation has been part of the problem. Is he using the Miralax? Feeling better yet?

## 2025-01-28 ENCOUNTER — TELEPHONE (OUTPATIENT)
Dept: INTERNAL MEDICINE | Facility: CLINIC | Age: 25
End: 2025-01-28

## 2025-01-28 ENCOUNTER — LAB (OUTPATIENT)
Dept: LAB | Facility: HOSPITAL | Age: 25
End: 2025-01-28
Payer: COMMERCIAL

## 2025-01-28 DIAGNOSIS — Z72.52 HIGH RISK HOMOSEXUAL BEHAVIOR: ICD-10-CM

## 2025-01-28 PROCEDURE — G0432 EIA HIV-1/HIV-2 SCREEN: HCPCS

## 2025-01-28 PROCEDURE — 87340 HEPATITIS B SURFACE AG IA: CPT

## 2025-01-28 PROCEDURE — 36415 COLL VENOUS BLD VENIPUNCTURE: CPT

## 2025-01-28 PROCEDURE — 86708 HEPATITIS A ANTIBODY: CPT

## 2025-01-28 PROCEDURE — 86803 HEPATITIS C AB TEST: CPT

## 2025-01-28 PROCEDURE — 86706 HEP B SURFACE ANTIBODY: CPT

## 2025-01-28 PROCEDURE — 86704 HEP B CORE ANTIBODY TOTAL: CPT

## 2025-01-28 PROCEDURE — 86592 SYPHILIS TEST NON-TREP QUAL: CPT

## 2025-01-28 NOTE — TELEPHONE ENCOUNTER
"    Caller: Reg Nichols \"Will\"    Relationship: Self    Best call back number: [phone]    What orders are you requesting (i.e. lab or imaging): URINALYSIS WHAT EVER HER NORMALLY GETS DONE       Additional notes: THE PATIENT WOULD LIKE TO HAVE URINALYSIS TEST DONE IF POSSIBLE FOR STD SCREENING           "

## 2025-01-29 LAB
HBV SURFACE AB SER RIA-ACNC: NORMAL
HBV SURFACE AG SERPL QL IA: NORMAL
HCV AB SER QL: NORMAL
HIV 1+2 AB+HIV1 P24 AG SERPL QL IA: NORMAL
RPR SER QL: NORMAL

## 2025-01-30 ENCOUNTER — LAB (OUTPATIENT)
Dept: LAB | Facility: HOSPITAL | Age: 25
End: 2025-01-30
Payer: COMMERCIAL

## 2025-01-30 ENCOUNTER — OFFICE VISIT (OUTPATIENT)
Dept: INTERNAL MEDICINE | Facility: CLINIC | Age: 25
End: 2025-01-30
Payer: COMMERCIAL

## 2025-01-30 VITALS
TEMPERATURE: 98.4 F | DIASTOLIC BLOOD PRESSURE: 62 MMHG | HEIGHT: 69 IN | HEART RATE: 96 BPM | BODY MASS INDEX: 18.66 KG/M2 | SYSTOLIC BLOOD PRESSURE: 124 MMHG | WEIGHT: 126 LBS

## 2025-01-30 DIAGNOSIS — K59.00 CONSTIPATION, UNSPECIFIED CONSTIPATION TYPE: ICD-10-CM

## 2025-01-30 DIAGNOSIS — R52 GENERALIZED BODY ACHES: Primary | ICD-10-CM

## 2025-01-30 DIAGNOSIS — R53.83 OTHER FATIGUE: ICD-10-CM

## 2025-01-30 DIAGNOSIS — R52 GENERALIZED BODY ACHES: ICD-10-CM

## 2025-01-30 LAB
ALBUMIN SERPL-MCNC: 4.7 G/DL (ref 3.5–5.2)
ALBUMIN/GLOB SERPL: 1.6 G/DL
ALP SERPL-CCNC: 97 U/L (ref 39–117)
ALT SERPL W P-5'-P-CCNC: 33 U/L (ref 1–41)
ANION GAP SERPL CALCULATED.3IONS-SCNC: 10.2 MMOL/L (ref 5–15)
AST SERPL-CCNC: 31 U/L (ref 1–40)
BACTERIA UR QL AUTO: NORMAL /HPF
BASOPHILS # BLD MANUAL: 0 10*3/MM3 (ref 0–0.2)
BASOPHILS NFR BLD MANUAL: 0 % (ref 0–1.5)
BILIRUB SERPL-MCNC: 0.7 MG/DL (ref 0–1.2)
BILIRUB UR QL STRIP: NEGATIVE
BUN SERPL-MCNC: 11 MG/DL (ref 6–20)
BUN/CREAT SERPL: 11.7 (ref 7–25)
CALCIUM SPEC-SCNC: 9.2 MG/DL (ref 8.6–10.5)
CHLORIDE SERPL-SCNC: 102 MMOL/L (ref 98–107)
CLARITY UR: CLEAR
CO2 SERPL-SCNC: 26.8 MMOL/L (ref 22–29)
COLOR UR: YELLOW
CREAT SERPL-MCNC: 0.94 MG/DL (ref 0.76–1.27)
DEPRECATED RDW RBC AUTO: 44.7 FL (ref 37–54)
EGFRCR SERPLBLD CKD-EPI 2021: 116.8 ML/MIN/1.73
EOSINOPHIL # BLD MANUAL: 0.04 10*3/MM3 (ref 0–0.4)
EOSINOPHIL NFR BLD MANUAL: 1 % (ref 0.3–6.2)
ERYTHROCYTE [DISTWIDTH] IN BLOOD BY AUTOMATED COUNT: 12.8 % (ref 12.3–15.4)
EXPIRATION DATE: NORMAL
FLUAV AG UPPER RESP QL IA.RAPID: NOT DETECTED
FLUBV AG UPPER RESP QL IA.RAPID: NOT DETECTED
GLOBULIN UR ELPH-MCNC: 3 GM/DL
GLUCOSE SERPL-MCNC: 95 MG/DL (ref 65–99)
GLUCOSE UR STRIP-MCNC: NEGATIVE MG/DL
HAV AB SER QL IA: POSITIVE
HBV CORE AB SERPL QL IA: NEGATIVE
HCT VFR BLD AUTO: 43.7 % (ref 37.5–51)
HGB BLD-MCNC: 15.1 G/DL (ref 13–17.7)
HGB UR QL STRIP.AUTO: NEGATIVE
HYALINE CASTS UR QL AUTO: NORMAL /LPF
INTERNAL CONTROL: NORMAL
KETONES UR QL STRIP: NEGATIVE
LEUKOCYTE ESTERASE UR QL STRIP.AUTO: NEGATIVE
LYMPHOCYTES # BLD MANUAL: 1.49 10*3/MM3 (ref 0.7–3.1)
LYMPHOCYTES NFR BLD MANUAL: 7.1 % (ref 5–12)
Lab: NORMAL
MCH RBC QN AUTO: 32.9 PG (ref 26.6–33)
MCHC RBC AUTO-ENTMCNC: 34.6 G/DL (ref 31.5–35.7)
MCV RBC AUTO: 95.2 FL (ref 79–97)
MONOCYTES # BLD: 0.31 10*3/MM3 (ref 0.1–0.9)
NEUTROPHILS # BLD AUTO: 2.58 10*3/MM3 (ref 1.7–7)
NEUTROPHILS NFR BLD MANUAL: 58.2 % (ref 42.7–76)
NITRITE UR QL STRIP: NEGATIVE
PH UR STRIP.AUTO: 6 [PH] (ref 5–8)
PLAT MORPH BLD: NORMAL
PLATELET # BLD AUTO: 158 10*3/MM3 (ref 140–450)
PMV BLD AUTO: 8.8 FL (ref 6–12)
POIKILOCYTOSIS BLD QL SMEAR: NORMAL
POTASSIUM SERPL-SCNC: 4 MMOL/L (ref 3.5–5.2)
PROT SERPL-MCNC: 7.7 G/DL (ref 6–8.5)
PROT UR QL STRIP: NEGATIVE
RBC # BLD AUTO: 4.59 10*6/MM3 (ref 4.14–5.8)
RBC # UR STRIP: NORMAL /HPF
REF LAB TEST METHOD: NORMAL
SARS-COV-2 AG UPPER RESP QL IA.RAPID: NOT DETECTED
SODIUM SERPL-SCNC: 139 MMOL/L (ref 136–145)
SP GR UR STRIP: 1.01 (ref 1–1.03)
SQUAMOUS #/AREA URNS HPF: NORMAL /HPF
UROBILINOGEN UR QL STRIP: NORMAL
VARIANT LYMPHS NFR BLD MANUAL: 1 % (ref 0–5)
VARIANT LYMPHS NFR BLD MANUAL: 32.7 % (ref 19.6–45.3)
WBC # UR STRIP: NORMAL /HPF
WBC MORPH BLD: NORMAL
WBC NRBC COR # BLD AUTO: 4.43 10*3/MM3 (ref 3.4–10.8)

## 2025-01-30 PROCEDURE — 99214 OFFICE O/P EST MOD 30 MIN: CPT

## 2025-01-30 PROCEDURE — 85007 BL SMEAR W/DIFF WBC COUNT: CPT

## 2025-01-30 PROCEDURE — 80053 COMPREHEN METABOLIC PANEL: CPT

## 2025-01-30 PROCEDURE — 85025 COMPLETE CBC W/AUTO DIFF WBC: CPT

## 2025-01-30 PROCEDURE — 81001 URINALYSIS AUTO W/SCOPE: CPT

## 2025-01-30 PROCEDURE — 87428 SARSCOV & INF VIR A&B AG IA: CPT

## 2025-01-30 RX ORDER — DOCUSATE SODIUM 100 MG/1
100 CAPSULE, LIQUID FILLED ORAL 2 TIMES DAILY
Qty: 60 CAPSULE | Refills: 0 | Status: SHIPPED | OUTPATIENT
Start: 2025-01-30

## 2025-01-30 NOTE — PROGRESS NOTES
"    Acute Office Visit      Name: Reg Nichols    : 2000     MRN: [id number]   Care Team: Patient Care Team:  Carmen Arnold APRN as PCP - General (Internal Medicine)    Chief Complaint  Constipation, Mass (Groin/thigh area on the left side ), Generalized Body Aches, and Dizziness    Subjective     History of Present Illness:    Darien is a pleasant 24-year-old male who comes to the office today with concerns for \"not feeling well.\"  He has experienced generalized bodyaches, dizziness, tachycardia, and palpated a bump in his left groin.      He notes that he has continued with some constipation over the last week.  He had been taking MiraLAX daily and has had a bowel movement approximately every other day however, states that he still feels like there is stool present.  He denies straining or difficulty passing stool since starting MiraLAX.  He denies abdominal pain or cramping, just a slight tenderness to the left lower quadrant of his abdomen.  He denies passing gas.  He reports belching.  He denies nausea.  He denies changes in his appetite.  He states that he has never had a big appetite but has not noticed any changes recently.    He does note some high risk sexual behavior and is taking Truvada.  He recently had HIV, hepatitis panel, syphilis labs drawn a couple days ago.  He reports about a week gap in taking Truvada however, denies any sexual encounters during that time.  Before his most recent sexual encounter, he had taken Truvada x 1 week.  Darien reports having significant anxiety when it comes to his health and the possibility of spike a disease.    Past Medical History:   Diagnosis Date    Allergic 2008    Anxiety 2014    Asthma 2008    Scoliosis 2015       Past Surgical History:   Procedure Laterality Date    APPENDECTOMY  2007    TONSILLECTOMY  2010       Social History     Socioeconomic History    Marital status: Single   Tobacco Use    Smoking status: Never    Smokeless tobacco: " "Never   Vaping Use    Vaping status: Never Used   Substance and Sexual Activity    Alcohol use: Yes     Alcohol/week: 1.0 - 2.0 standard drink of alcohol     Types: 1 - 2 Drinks containing 0.5 oz of alcohol per week     Comment: Only drink for occasions so not every week    Drug use: Never    Sexual activity: Yes     Partners: Male     Birth control/protection: Condom, Partner of same sex, Pill         Current Outpatient Medications:     cyclobenzaprine (FLEXERIL) 5 MG tablet, Take 1 tablet by mouth At Night As Needed for Muscle Spasms., Disp: 14 tablet, Rfl: 0    emtricitabine-tenofovir (TRUVADA) 200-300 MG per tablet, TAKE 1 TABLET BY MOUTH DAILY, Disp: 90 tablet, Rfl: 0    docusate sodium (Colace) 100 MG capsule, Take 1 capsule by mouth 2 (Two) Times a Day., Disp: 60 capsule, Rfl: 0    Procedures    PHQ-9 Total Score:      Objective     Vital Signs  /62 (BP Location: Left arm, Patient Position: Sitting, Cuff Size: Adult)   Pulse 96   Temp 98.4 °F (36.9 °C) (Temporal)   Ht 174 cm (68.5\")   Wt 57.2 kg (126 lb)   BMI 18.88 kg/m²   Estimated body mass index is 18.88 kg/m² as calculated from the following:    Height as of this encounter: 174 cm (68.5\").    Weight as of this encounter: 57.2 kg (126 lb).    BMI is within normal parameters. No other follow-up for BMI required.      Physical Exam  Vitals and nursing note reviewed.   HENT:      Head: Normocephalic.   Cardiovascular:      Rate and Rhythm: Normal rate.   Pulmonary:      Effort: Pulmonary effort is normal.      Breath sounds: Normal breath sounds.   Abdominal:      General: Abdomen is flat. Bowel sounds are normal.      Palpations: Abdomen is soft.      Tenderness: There is abdominal tenderness.   Lymphadenopathy:      Lower Body: Left inguinal adenopathy present.   Skin:     General: Skin is warm and dry.   Neurological:      General: No focal deficit present.      Mental Status: He is alert and oriented to person, place, and time.   Psychiatric:  "        Mood and Affect: Mood normal.         Behavior: Behavior normal.         Thought Content: Thought content normal.         Judgment: Judgment normal.          Assessment and Plan     Assessment/Plan:  Diagnoses and all orders for this visit:    1. Generalized body aches (Primary)  -     POCT SARS-CoV-2 + Flu Antigen JONY  -     CBC & Differential; Future  -     Comprehensive Metabolic Panel; Future  -     Urinalysis With Microscopic - Urine, Clean Catch; Future  -Flu/COVID-negative in office today.  -Encouraged to increase consumption of water to promote hydration.  -Encouraged small, bland, frequent meals throughout the day.  -Will obtain labs today.    2. Other fatigue  -     CBC & Differential; Future  -     Comprehensive Metabolic Panel; Future  -     Urinalysis With Microscopic - Urine, Clean Catch; Future    3. Constipation, unspecified constipation type  -     docusate sodium (Colace) 100 MG capsule; Take 1 capsule by mouth 2 (Two) Times a Day.  Dispense: 60 capsule; Refill: 0  -Encouraged to increase consumption of water to promote hydration.  -Start docusate sodium twice daily.  -Continue MiraLAX daily over the next 4-7 days until bowel movements return to regular routine.     There are no Patient Instructions on file for this visit.  Plan of care reviewed with patient at the conclusion of today's visit. Education was provided regarding diagnosis, management and any prescribed or recommended OTC medications.  Patient verbalizes understanding of and agreement with management plan.    Follow Up  Return for Next Scheduled Follow up.    ELADIA Medellin

## 2025-02-03 DIAGNOSIS — Z29.81 ENCOUNTER FOR HIV PRE-EXPOSURE PROPHYLAXIS: ICD-10-CM

## 2025-02-03 RX ORDER — EMTRICITABINE AND TENOFOVIR DISOPROXIL FUMARATE 200; 300 MG/1; MG/1
1 TABLET, FILM COATED ORAL DAILY
Qty: 90 TABLET | Refills: 0 | Status: SHIPPED | OUTPATIENT
Start: 2025-02-03

## 2025-02-04 ENCOUNTER — OFFICE VISIT (OUTPATIENT)
Dept: INTERNAL MEDICINE | Facility: CLINIC | Age: 25
End: 2025-02-04
Payer: COMMERCIAL

## 2025-02-04 ENCOUNTER — LAB (OUTPATIENT)
Dept: LAB | Facility: HOSPITAL | Age: 25
End: 2025-02-04
Payer: COMMERCIAL

## 2025-02-04 VITALS
HEIGHT: 69 IN | WEIGHT: 124 LBS | DIASTOLIC BLOOD PRESSURE: 66 MMHG | TEMPERATURE: 98.2 F | SYSTOLIC BLOOD PRESSURE: 108 MMHG | HEART RATE: 88 BPM | BODY MASS INDEX: 18.37 KG/M2

## 2025-02-04 DIAGNOSIS — R50.9 FEVER, UNSPECIFIED FEVER CAUSE: ICD-10-CM

## 2025-02-04 DIAGNOSIS — R52 GENERALIZED BODY ACHES: Primary | ICD-10-CM

## 2025-02-04 DIAGNOSIS — R61 NIGHT SWEATS: ICD-10-CM

## 2025-02-04 DIAGNOSIS — R52 GENERALIZED BODY ACHES: ICD-10-CM

## 2025-02-04 DIAGNOSIS — R53.83 OTHER FATIGUE: ICD-10-CM

## 2025-02-04 DIAGNOSIS — R11.0 NAUSEA: ICD-10-CM

## 2025-02-04 DIAGNOSIS — R59.1 LYMPHADENOPATHY: ICD-10-CM

## 2025-02-04 LAB — ERYTHROCYTE [SEDIMENTATION RATE] IN BLOOD: 8 MM/HR (ref 0–15)

## 2025-02-04 PROCEDURE — 99214 OFFICE O/P EST MOD 30 MIN: CPT

## 2025-02-04 PROCEDURE — 85025 COMPLETE CBC W/AUTO DIFF WBC: CPT

## 2025-02-04 PROCEDURE — 82150 ASSAY OF AMYLASE: CPT

## 2025-02-04 PROCEDURE — 84550 ASSAY OF BLOOD/URIC ACID: CPT

## 2025-02-04 PROCEDURE — 86140 C-REACTIVE PROTEIN: CPT

## 2025-02-04 PROCEDURE — G0432 EIA HIV-1/HIV-2 SCREEN: HCPCS

## 2025-02-04 PROCEDURE — 86431 RHEUMATOID FACTOR QUANT: CPT

## 2025-02-04 PROCEDURE — 86038 ANTINUCLEAR ANTIBODIES: CPT

## 2025-02-04 PROCEDURE — 85007 BL SMEAR W/DIFF WBC COUNT: CPT

## 2025-02-04 PROCEDURE — 85652 RBC SED RATE AUTOMATED: CPT

## 2025-02-04 PROCEDURE — 83690 ASSAY OF LIPASE: CPT

## 2025-02-04 PROCEDURE — 36415 COLL VENOUS BLD VENIPUNCTURE: CPT

## 2025-02-04 PROCEDURE — 86308 HETEROPHILE ANTIBODY SCREEN: CPT

## 2025-02-04 PROCEDURE — 82550 ASSAY OF CK (CPK): CPT

## 2025-02-04 NOTE — PROGRESS NOTES
Office Note     Name: Reg Nichols    : 2000     MRN: [id number]   Care Team: Patient Care Team:  Carmen Arnold APRN as PCP - General (Internal Medicine)    Chief Complaint  Generalized Body Aches and Constipation    Subjective     History of Present Illness:    Darien is a pleasant 24-year-old male who comes to the office today with continued symptoms of bodyaches, frequent night sweats, chills, headache, poor appetite, nausea, and an enlarged lymph node to the left groin.    Darien states that the symptoms have been going on for approximately 1 week.    He also notes that he started the docusate sodium and MiraLAX after his appointment last week.  He noted some small red areas to his right upper extremity after starting the docusate sodium so he discontinued its use thinking it was an allergic reaction.  He was able to have a bowel movement and also reports having some episodes of diarrhea approximately 2 to 3 days ago.    He has been checking his temperature and reports no fever.  He states that the highest temp was 99.4.    He denies chest pain, shortness of breath, nasal congestion.  He denies recent sick contacts.  He denies any urinary symptoms, changes in bowel patterns.  He denies any recent, new sexual partners.      Past Medical History:   Diagnosis Date    Allergic 2008    Anxiety 2014    Asthma 2008    Scoliosis 2015       Past Surgical History:   Procedure Laterality Date    APPENDECTOMY  2007    TONSILLECTOMY  2010       Social History     Socioeconomic History    Marital status: Single   Tobacco Use    Smoking status: Never    Smokeless tobacco: Never   Vaping Use    Vaping status: Never Used   Substance and Sexual Activity    Alcohol use: Yes     Alcohol/week: 1.0 - 2.0 standard drink of alcohol     Types: 1 - 2 Drinks containing 0.5 oz of alcohol per week     Comment: Only drink for occasions so not every week    Drug use: Never    Sexual activity: Yes     Partners: Male     Birth  "control/protection: Condom, Partner of same sex, Pill         Current Outpatient Medications:     cyclobenzaprine (FLEXERIL) 5 MG tablet, Take 1 tablet by mouth At Night As Needed for Muscle Spasms., Disp: 14 tablet, Rfl: 0    docusate sodium (Colace) 100 MG capsule, Take 1 capsule by mouth 2 (Two) Times a Day., Disp: 60 capsule, Rfl: 0    emtricitabine-tenofovir (TRUVADA) 200-300 MG per tablet, Take 1 tablet by mouth Daily., Disp: 90 tablet, Rfl: 0    Procedures    PHQ-9 Total Score:      Objective     Vital Signs  /66 (BP Location: Left arm, Patient Position: Sitting, Cuff Size: Adult)   Pulse 88   Temp 98.2 °F (36.8 °C) (Temporal)   Ht 174 cm (68.5\")   Wt 56.2 kg (124 lb)   BMI 18.58 kg/m²   Estimated body mass index is 18.58 kg/m² as calculated from the following:    Height as of this encounter: 174 cm (68.5\").    Weight as of this encounter: 56.2 kg (124 lb).    BMI is within normal parameters. No other follow-up for BMI required.      Physical Exam  Vitals and nursing note reviewed.   HENT:      Head: Normocephalic.      Right Ear: Tympanic membrane, ear canal and external ear normal.      Left Ear: Tympanic membrane, ear canal and external ear normal.      Nose: Nose normal.      Mouth/Throat:      Mouth: Mucous membranes are moist.      Pharynx: Oropharynx is clear.   Eyes:      Pupils: Pupils are equal, round, and reactive to light.   Cardiovascular:      Rate and Rhythm: Normal rate.   Pulmonary:      Effort: Pulmonary effort is normal.      Breath sounds: Normal breath sounds.   Abdominal:      General: Abdomen is flat. Bowel sounds are normal. There is no distension.      Palpations: Abdomen is soft.      Tenderness: There is no abdominal tenderness. There is no guarding.   Lymphadenopathy:      Head:      Right side of head: No submental, submandibular, tonsillar, preauricular, posterior auricular or occipital adenopathy.      Left side of head: No submental, submandibular, tonsillar, " preauricular, posterior auricular or occipital adenopathy.      Cervical: No cervical adenopathy.      Right cervical: No superficial, deep or posterior cervical adenopathy.     Left cervical: No superficial, deep or posterior cervical adenopathy.      Upper Body:      Right upper body: No supraclavicular adenopathy.      Left upper body: No supraclavicular adenopathy.      Lower Body: No right inguinal adenopathy. Left inguinal adenopathy present.   Skin:     General: Skin is warm and dry.   Neurological:      General: No focal deficit present.      Mental Status: He is alert and oriented to person, place, and time.   Psychiatric:         Mood and Affect: Mood normal.         Behavior: Behavior normal.         Thought Content: Thought content normal.         Judgment: Judgment normal.          Assessment and Plan     Assessment/Plan:  Diagnoses and all orders for this visit:    1. Generalized body aches (Primary)  -     CK; Future  -     Sedimentation Rate; Future  -     C-reactive protein; Future  -     AMMON With / DsDNA, RNP, Sjogrens A / B, Egan; Future  -     HIV-1/O/2 Ag/Ab w Reflex; Future  -     Uric acid; Future  -     Rheumatoid Factor; Future  -     Lipase; Future  -     Amylase; Future  -     Mononucleosis Test, Qual With Reflex; Future    2. Lymphadenopathy  -     CK; Future  -     Sedimentation Rate; Future  -     C-reactive protein; Future  -     AMMON With / DsDNA, RNP, Sjogrens A / B, Egan; Future  -     HIV-1/O/2 Ag/Ab w Reflex; Future  -     Uric acid; Future  -     Rheumatoid Factor; Future  -     Lipase; Future  -     Amylase; Future  -     Mononucleosis Test, Qual With Reflex; Future    3. Other fatigue  -     CK; Future  -     Sedimentation Rate; Future  -     C-reactive protein; Future  -     AMMON With / DsDNA, RNP, Sjogrens A / B, Egan; Future  -     HIV-1/O/2 Ag/Ab w Reflex; Future  -     Uric acid; Future  -     Rheumatoid Factor; Future  -     Lipase; Future  -     Amylase; Future  -      Mononucleosis Test, Qual With Reflex; Future    4. Night sweats  -     CK; Future  -     Sedimentation Rate; Future  -     C-reactive protein; Future  -     AMMON With / DsDNA, RNP, Sjogrens A / B, Egan; Future  -     HIV-1/O/2 Ag/Ab w Reflex; Future  -     Uric acid; Future  -     Rheumatoid Factor; Future  -     Lipase; Future  -     Amylase; Future  -     Mononucleosis Test, Qual With Reflex; Future    5. Nausea  -     Lipase; Future  -     Amylase; Future    -Review of recent lab work to include UA, CBC, CMP, hepatitis panel, HIV, RPR, flu, COVID all negative.  -Physical exam negative.  -Will evaluate more labs today for further evaluation and treatment.       There are no Patient Instructions on file for this visit.  Plan of care reviewed with patient at the conclusion of today's visit. Education was provided regarding diagnosis, management and any prescribed or recommended OTC medications.  Patient verbalizes understanding of and agreement with management plan.    Follow Up  Return for Next Scheduled Follow up.    Kandy Muniz, APRN

## 2025-02-05 ENCOUNTER — TELEPHONE (OUTPATIENT)
Dept: INTERNAL MEDICINE | Facility: CLINIC | Age: 25
End: 2025-02-05

## 2025-02-05 ENCOUNTER — HOSPITAL ENCOUNTER (OUTPATIENT)
Dept: CT IMAGING | Facility: HOSPITAL | Age: 25
Discharge: HOME | End: 2025-02-05
Admitting: NURSE PRACTITIONER
Payer: COMMERCIAL

## 2025-02-05 DIAGNOSIS — R10.32 LLQ PAIN: ICD-10-CM

## 2025-02-05 DIAGNOSIS — R50.9 FEVER, UNSPECIFIED FEVER CAUSE: Primary | ICD-10-CM

## 2025-02-05 LAB
AMYLASE SERPL-CCNC: 35 U/L (ref 28–100)
ANA SER QL: NEGATIVE
BASOPHILS # BLD MANUAL: 0 10*3/MM3 (ref 0–0.2)
BASOPHILS NFR BLD MANUAL: 0 % (ref 0–1.5)
CHROMATIN AB SERPL-ACNC: <10 IU/ML (ref 0–14)
CK SERPL-CCNC: 85 U/L (ref 20–200)
CRP SERPL-MCNC: 0.6 MG/DL (ref 0–0.5)
DEPRECATED RDW RBC AUTO: 45.6 FL (ref 37–54)
EOSINOPHIL # BLD MANUAL: 0.17 10*3/MM3 (ref 0–0.4)
EOSINOPHIL NFR BLD MANUAL: 2 % (ref 0.3–6.2)
ERYTHROCYTE [DISTWIDTH] IN BLOOD BY AUTOMATED COUNT: 13.1 % (ref 12.3–15.4)
HCT VFR BLD AUTO: 44.3 % (ref 37.5–51)
HETEROPH AB SER QL LA: NEGATIVE
HGB BLD-MCNC: 15.2 G/DL (ref 13–17.7)
HIV 1+2 AB+HIV1 P24 AG SERPL QL IA: NORMAL
LIPASE SERPL-CCNC: 21 U/L (ref 13–60)
LYMPHOCYTES # BLD MANUAL: 3.37 10*3/MM3 (ref 0.7–3.1)
LYMPHOCYTES NFR BLD MANUAL: 15.2 % (ref 5–12)
MCH RBC QN AUTO: 32.4 PG (ref 26.6–33)
MCHC RBC AUTO-ENTMCNC: 34.3 G/DL (ref 31.5–35.7)
MCV RBC AUTO: 94.5 FL (ref 79–97)
MONOCYTES # BLD: 1.3 10*3/MM3 (ref 0.1–0.9)
NEUTROPHILS # BLD AUTO: 3.71 10*3/MM3 (ref 1.7–7)
NEUTROPHILS NFR BLD MANUAL: 43.4 % (ref 42.7–76)
PLAT MORPH BLD: NORMAL
PLATELET # BLD AUTO: 119 10*3/MM3 (ref 140–450)
PMV BLD AUTO: 9.8 FL (ref 6–12)
RBC # BLD AUTO: 4.69 10*6/MM3 (ref 4.14–5.8)
RBC MORPH BLD: NORMAL
SMUDGE CELLS BLD QL SMEAR: ABNORMAL
URATE SERPL-MCNC: 4.4 MG/DL (ref 3.4–7)
VARIANT LYMPHS NFR BLD MANUAL: 39.4 % (ref 19.6–45.3)
WBC NRBC COR # BLD AUTO: 8.55 10*3/MM3 (ref 3.4–10.8)

## 2025-02-05 PROCEDURE — 74176 CT ABD & PELVIS W/O CONTRAST: CPT

## 2025-02-05 RX ORDER — ONDANSETRON 4 MG/1
4 TABLET, ORALLY DISINTEGRATING ORAL EVERY 8 HOURS PRN
Qty: 15 TABLET | Refills: 0 | Status: SHIPPED | OUTPATIENT
Start: 2025-02-05

## 2025-02-05 NOTE — TELEPHONE ENCOUNTER
"Caller: Reg Nichols \"Will\"    Relationship: Self    Best call back number: [phone]     What medication are you requesting: SOMETHING FOR SYMPTOMS     What are your current symptoms: NAUSEA AND VOMITING     How long have you been experiencing symptoms: SINCE 2/5/25     Have you had these symptoms before:    [] Yes  [x] No    Have you been treated for these symptoms before:   [] Yes  [x] No    If a prescription is needed, what is your preferred pharmacy and phone number: [id number] - [place], [place] - [address] [facility] [address] [facility] [phone] [facility] [phone]      Additional notes:          "

## 2025-02-05 NOTE — TELEPHONE ENCOUNTER
I am sending Zofran for nausea.  Have him take 1 as soon as it is ready to be picked up and as needed every 4-6 hours.  Liquid diet for now.  I want him scheduled for a CT left lower quadrant today and follow-up here with someone tomorrow.  To ER if worse pain or vomiting.

## 2025-02-06 ENCOUNTER — OFFICE VISIT (OUTPATIENT)
Dept: INTERNAL MEDICINE | Facility: CLINIC | Age: 25
End: 2025-02-06
Payer: COMMERCIAL

## 2025-02-06 VITALS
SYSTOLIC BLOOD PRESSURE: 108 MMHG | HEART RATE: 72 BPM | WEIGHT: 122 LBS | TEMPERATURE: 100.2 F | DIASTOLIC BLOOD PRESSURE: 72 MMHG | HEIGHT: 69 IN | BODY MASS INDEX: 18.07 KG/M2

## 2025-02-06 DIAGNOSIS — R59.1 LYMPHADENOPATHY: ICD-10-CM

## 2025-02-06 DIAGNOSIS — R16.1 SPLENOMEGALY: ICD-10-CM

## 2025-02-06 DIAGNOSIS — R10.32 LLQ PAIN: ICD-10-CM

## 2025-02-06 DIAGNOSIS — R52 GENERALIZED BODY ACHES: ICD-10-CM

## 2025-02-06 DIAGNOSIS — R50.9 FEVER, UNSPECIFIED FEVER CAUSE: Primary | ICD-10-CM

## 2025-02-06 PROBLEM — R59.0 LYMPHADENOPATHY, INGUINAL: Status: ACTIVE | Noted: 2025-02-06

## 2025-02-06 PROCEDURE — 99213 OFFICE O/P EST LOW 20 MIN: CPT

## 2025-02-06 NOTE — PROGRESS NOTES
Office Note     Name: Reg Nichols    : 2000     MRN: [id number]   Care Team: Patient Care Team:  Carmen Arnold APRN as PCP - General (Internal Medicine)    Chief Complaint  Imaging Only (Follow up on CT results )    Subjective     History of Present Illness:    Darien is a 24 year old male with lingering symptoms of fatigue, body aches, low grade fevers, and loss of appetite. The infectious workup has been negative at last several visits. He reports today for CT results. The results were discussed with the patient, as well as the abnormal blood counts.       Past Medical History:   Diagnosis Date    Allergic 2008    Anxiety 2014    Asthma 2008    Scoliosis 2015       Past Surgical History:   Procedure Laterality Date    APPENDECTOMY  2007    TONSILLECTOMY  2010       Social History     Socioeconomic History    Marital status: Single   Tobacco Use    Smoking status: Never    Smokeless tobacco: Never   Vaping Use    Vaping status: Never Used   Substance and Sexual Activity    Alcohol use: Yes     Alcohol/week: 1.0 - 2.0 standard drink of alcohol     Types: 1 - 2 Drinks containing 0.5 oz of alcohol per week     Comment: Only drink for occasions so not every week    Drug use: Never    Sexual activity: Yes     Partners: Male     Birth control/protection: Condom, Partner of same sex, Pill         Current Outpatient Medications:     cyclobenzaprine (FLEXERIL) 5 MG tablet, Take 1 tablet by mouth At Night As Needed for Muscle Spasms., Disp: 14 tablet, Rfl: 0    docusate sodium (Colace) 100 MG capsule, Take 1 capsule by mouth 2 (Two) Times a Day., Disp: 60 capsule, Rfl: 0    emtricitabine-tenofovir (TRUVADA) 200-300 MG per tablet, Take 1 tablet by mouth Daily., Disp: 90 tablet, Rfl: 0    ondansetron ODT (ZOFRAN-ODT) 4 MG disintegrating tablet, Place 1 tablet on the tongue Every 8 (Eight) Hours As Needed for Nausea or Vomiting., Disp: 15 tablet, Rfl: 0    Procedures    PHQ-9 Total Score:  0    Objective  "    Vital Signs  /72 (BP Location: Left arm, Patient Position: Sitting, Cuff Size: Adult)   Pulse 72   Temp 100.2 °F (37.9 °C) (Temporal)   Ht 174 cm (68.5\")   Wt 55.3 kg (122 lb)   BMI 18.28 kg/m²   Estimated body mass index is 18.28 kg/m² as calculated from the following:    Height as of this encounter: 174 cm (68.5\").    Weight as of this encounter: 55.3 kg (122 lb).    BMI is within normal parameters. No other follow-up for BMI required.      Physical Exam  Vitals and nursing note reviewed.   HENT:      Head: Normocephalic.   Pulmonary:      Effort: Pulmonary effort is normal.   Skin:     General: Skin is warm and dry.   Neurological:      General: No focal deficit present.      Mental Status: He is alert and oriented to person, place, and time.   Psychiatric:         Mood and Affect: Mood normal.         Behavior: Behavior normal.         Thought Content: Thought content normal.         Judgment: Judgment normal.        Assessment and Plan     Assessment/Plan:  Diagnoses and all orders for this visit:    1. Fever, unspecified fever cause (Primary)  -     Lactate Dehydrogenase; Future  -negative infectious workup     2. Splenomegaly  -referral to hematology oncology    3. Lymphadenopathy  -     Lactate Dehydrogenase; Future  -inguinal, left  -referral to hematology oncology    4. LLQ pain  -likely caused by splenomegaly    5. Generalized body aches  -     Lactate Dehydrogenase; Future    There are no Patient Instructions on file for this visit.  Plan of care reviewed with patient at the conclusion of today's visit. Education was provided regarding diagnosis, management and any prescribed or recommended OTC medications.  Patient verbalizes understanding of and agreement with management plan.    Follow Up  Return for Next scheduled follow up, Next Scheduled Follow up.    ELADIA Medellin     "

## 2025-02-10 ENCOUNTER — CONSULT (OUTPATIENT)
Age: 25
End: 2025-02-10
Payer: COMMERCIAL

## 2025-02-10 VITALS
HEART RATE: 101 BPM | WEIGHT: 125 LBS | SYSTOLIC BLOOD PRESSURE: 139 MMHG | HEIGHT: 69 IN | TEMPERATURE: 99.2 F | BODY MASS INDEX: 18.51 KG/M2 | DIASTOLIC BLOOD PRESSURE: 72 MMHG | OXYGEN SATURATION: 100 % | RESPIRATION RATE: 16 BRPM

## 2025-02-10 DIAGNOSIS — R59.0 LYMPHADENOPATHY, INGUINAL: Primary | ICD-10-CM

## 2025-02-10 PROCEDURE — 99204 OFFICE O/P NEW MOD 45 MIN: CPT | Performed by: INTERNAL MEDICINE

## 2025-02-10 NOTE — LETTER
February 10, 2025     ELADIA Fischer  [address]  [address]  [place] [id number]    Patient: Reg Nichols   YOB: 2000   Date of Visit: 2/10/2025     Dear ELADIA Fischer:       Thank you for referring Reg Nichols to me for evaluation. Below are the relevant portions of my assessment and plan of care.    If you have questions, please do not hesitate to call me. I look forward to following Reg along with you.         Sincerely,        Ephraim Ramos MD        CC: ELADIA Medellin Arvinda, MD  02/10/25 2009  Sign when Signing Visit  ID: 24 y.o. year old male from [place] [address]    PCP: Carmen Arnold APRN    REFERRING PHYSICIAN: ELADIA Medellin    Reason for Consultation: Inguinal adenopathy with fatigue    Dear Ms. Muniz    It is a pleasure to meet Mr. Nichols today.  Is a very pleasant 24-year-old gentleman who presents today for consultation for mild lymphadenopathy and fatigue and fevers.  He is otherwise in good health.  No recent history of EBV.  No history of obvious infection.  Patient did have unprotected sexual contact recently.  Patient does have prophylactic Truvada though he has not been taking it for 2 months and then he started taking it a week prior to this event.    Past Medical History:   Diagnosis Date   • Allergic 2008   • Anxiety 2014   • Asthma 2008   • Scoliosis 2015       Past Surgical History:   Procedure Laterality Date   • APPENDECTOMY  2007   • TONSILLECTOMY  2010       Social History     Socioeconomic History   • Marital status: Single   Tobacco Use   • Smoking status: Never   • Smokeless tobacco: Never   Vaping Use   • Vaping status: Never Used   Substance and Sexual Activity   • Alcohol use: Yes     Alcohol/week: 1.0 - 2.0 standard drink of alcohol     Types: 1 - 2 Drinks containing 0.5 oz of alcohol per week     Comment: Only drink for occasions so not every week   • Drug use: Never   • Sexual  activity: Yes     Partners: Male     Birth control/protection: Condom, Partner of same sex, Pill       Family History   Adopted: Yes   Family history unknown: Yes       Review of Systems:    16 point review of systems was performed and reviewed and scanned into the EMR    Review of Systems - Oncology      Current Outpatient Medications:   •  emtricitabine-tenofovir (TRUVADA) 200-300 MG per tablet, Take 1 tablet by mouth Daily., Disp: 90 tablet, Rfl: 0  •  ondansetron ODT (ZOFRAN-ODT) 4 MG disintegrating tablet, Place 1 tablet on the tongue Every 8 (Eight) Hours As Needed for Nausea or Vomiting., Disp: 15 tablet, Rfl: 0         No Known Allergies                  Objective    Vitals:    02/10/25 1533   BP: 139/72   Pulse: 101   Resp: 16   Temp: 99.2 °F (37.3 °C)   SpO2: 100%     Body mass index is 18.46 kg/m².  Body surface area is 1.69 meters squared.        02/10/25  1533   Weight: 56.7 kg (125 lb)     Pain Score    02/10/25 1533   PainSc: 0-No pain          Physical Exam    General: well appearing, in no acute distress  HEENT: sclera anicteric, oropharynx clear, neck is supple  Lymphatics: no cervical, supraclavicular, or axillary adenopathy  Cardiovascular: regular rate and rhythm, no murmurs, rubs or gallops  Lungs: clear to auscultation bilaterally  Abdomen: soft, nontender, nondistended.  No palpable organomegaly  Extremities: no lower extremity edema  Skin: no rashes, lesions, bruising, or petechiae  Msk:  Shows no weakness of the large muscle groups  Psych: Mood is stable        Lab Results   Component Value Date    GLUCOSE 95 01/30/2025    BUN 11 01/30/2025    CREATININE 0.94 01/30/2025     01/30/2025    K 4.0 01/30/2025     01/30/2025    CO2 26.8 01/30/2025    CALCIUM 9.2 01/30/2025    PROTEINTOT 7.7 01/30/2025    ALBUMIN 4.7 01/30/2025    BILITOT 0.7 01/30/2025    ALKPHOS 97 01/30/2025    AST 31 01/30/2025    ALT 33 01/30/2025       Lab Results   Component Value Date    HGB 15.2 02/04/2025     HCT 44.3 02/04/2025    MCV 94.5 02/04/2025     (L) 02/04/2025    WBC 8.55 02/04/2025    NEUTROABS 3.71 02/04/2025    LYMPHSABS 1.61 06/13/2024    MONOSABS 0.46 06/13/2024    EOSABS 0.17 02/04/2025    BASOSABS 0.00 02/04/2025       CT Abdomen Pelvis Without Contrast    Result Date: 2/5/2025  Impression: 1.There is a slightly prominent left inguinal lymph node that may be reactive in nature. There are additional small mesenteric and retroperitoneal lymph nodes which may also be reactive in nature. 2.Scoliosis thoracolumbar spine. 3.Some bulging of the annulus of the disc at L4-5 and L5-S1. 4.Splenomegaly. Electronically Signed: Sreekanth Helm MD  2/5/2025 12:25 PM EST  Workstation ID: [id number]    XR Abdomen KUB    Result Date: 1/24/2025  Impression: Nonobstructive bowel gas pattern with mild to moderate stool burden. Electronically Signed: Reji Weiss MD  1/24/2025 3:15 PM EST  Workstation ID: [id number]        Assessment     1.  Inguinal adenopathy with fatigue and fevers.  Likely a viral illness.  Unlikely to be HIV though possible.  I think it is reasonable to test him in a month and see if his symptoms have subsided.  This does not appear to be a malignant process such as lymphoma at this stage at least.  Usually the testing for HIV takes about a month after exposure to be positive.  But while on Truvada, the likelihood that he contracted HIV is fairly small.          Thank you for allowing me to participate in the care of this patient.    Yours sincerely,    Ephraim Ramos MD  [facility]  Hematology and Oncology    Return on: 03/17/25  Return in (Approximately): 1 month    Orders Placed This Encounter   Procedures   • HIV-1 / O / 2 Ag / Antibody 4th Generation     Standing Status:   Future     Standing Expiration Date:   2/10/2026     Order Specific Question:   Release to patient     Answer:   Routine Release [[id number]]   • CBC & Differential     Standing Status:   Future     Standing  Expiration Date:   2/10/2026     Order Specific Question:   Manual Differential     Answer:   No     Order Specific Question:   Release to patient     Answer:   Routine Release [[id number]]

## 2025-02-11 ENCOUNTER — TELEPHONE (OUTPATIENT)
Dept: INTERNAL MEDICINE | Facility: CLINIC | Age: 25
End: 2025-02-11
Payer: COMMERCIAL

## 2025-02-11 NOTE — TELEPHONE ENCOUNTER
"  Caller: Reg Nichols \"Will\"    Relationship: Self    Best call back number: [phone]     What is the medical concern/diagnosis: BULGING DISC    What specialty or service is being requested: ORTHO       Any additional details: SCANS SHOWED BULGING DISC AND WANTS TO EXPLORE NEXT STEPS CALL IF QUESTIONS OR READY TO SCHEDULE   "

## 2025-02-11 NOTE — PROGRESS NOTES
ID: 24 y.o. year old male from [facility] [id number]    PCP: Carmen Arnold APRN    REFERRING PHYSICIAN: ELADIA Medellin    Reason for Consultation: Inguinal adenopathy with fatigue    Dear Ms. Muniz    It is a pleasure to meet Mr. Nichols today.  Is a very pleasant 24-year-old gentleman who presents today for consultation for mild lymphadenopathy and fatigue and fevers.  He is otherwise in good health.  No recent history of EBV.  No history of obvious infection.  Patient did have unprotected sexual contact recently.  Patient does have prophylactic Truvada though he has not been taking it for 2 months and then he started taking it a week prior to this event.    Past Medical History:   Diagnosis Date    Allergic 2008    Anxiety 2014    Asthma 2008    Scoliosis 2015       Past Surgical History:   Procedure Laterality Date    APPENDECTOMY  2007    TONSILLECTOMY  2010       Social History     Socioeconomic History    Marital status: Single   Tobacco Use    Smoking status: Never    Smokeless tobacco: Never   Vaping Use    Vaping status: Never Used   Substance and Sexual Activity    Alcohol use: Yes     Alcohol/week: 1.0 - 2.0 standard drink of alcohol     Types: 1 - 2 Drinks containing 0.5 oz of alcohol per week     Comment: Only drink for occasions so not every week    Drug use: Never    Sexual activity: Yes     Partners: Male     Birth control/protection: Condom, Partner of same sex, Pill       Family History   Adopted: Yes   Family history unknown: Yes       Review of Systems:    16 point review of systems was performed and reviewed and scanned into the EMR    Review of Systems - Oncology      Current Outpatient Medications:     emtricitabine-tenofovir (TRUVADA) 200-300 MG per tablet, Take 1 tablet by mouth Daily., Disp: 90 tablet, Rfl: 0    ondansetron ODT (ZOFRAN-ODT) 4 MG disintegrating tablet, Place 1 tablet on the tongue Every 8 (Eight) Hours As Needed for Nausea or Vomiting., Disp: 15 tablet, Rfl: 0          No Known Allergies                  Objective     Vitals:    02/10/25 1533   BP: 139/72   Pulse: 101   Resp: 16   Temp: 99.2 °F (37.3 °C)   SpO2: 100%     Body mass index is 18.46 kg/m².  Body surface area is 1.69 meters squared.        02/10/25  1533   Weight: 56.7 kg (125 lb)     Pain Score    02/10/25 1533   PainSc: 0-No pain          Physical Exam    General: well appearing, in no acute distress  HEENT: sclera anicteric, oropharynx clear, neck is supple  Lymphatics: no cervical, supraclavicular, or axillary adenopathy  Cardiovascular: regular rate and rhythm, no murmurs, rubs or gallops  Lungs: clear to auscultation bilaterally  Abdomen: soft, nontender, nondistended.  No palpable organomegaly  Extremities: no lower extremity edema  Skin: no rashes, lesions, bruising, or petechiae  Msk:  Shows no weakness of the large muscle groups  Psych: Mood is stable        Lab Results   Component Value Date    GLUCOSE 95 01/30/2025    BUN 11 01/30/2025    CREATININE 0.94 01/30/2025     01/30/2025    K 4.0 01/30/2025     01/30/2025    CO2 26.8 01/30/2025    CALCIUM 9.2 01/30/2025    PROTEINTOT 7.7 01/30/2025    ALBUMIN 4.7 01/30/2025    BILITOT 0.7 01/30/2025    ALKPHOS 97 01/30/2025    AST 31 01/30/2025    ALT 33 01/30/2025       Lab Results   Component Value Date    HGB 15.2 02/04/2025    HCT 44.3 02/04/2025    MCV 94.5 02/04/2025     (L) 02/04/2025    WBC 8.55 02/04/2025    NEUTROABS 3.71 02/04/2025    LYMPHSABS 1.61 06/13/2024    MONOSABS 0.46 06/13/2024    EOSABS 0.17 02/04/2025    BASOSABS 0.00 02/04/2025       CT Abdomen Pelvis Without Contrast    Result Date: 2/5/2025  Impression: 1.There is a slightly prominent left inguinal lymph node that may be reactive in nature. There are additional small mesenteric and retroperitoneal lymph nodes which may also be reactive in nature. 2.Scoliosis thoracolumbar spine. 3.Some bulging of the annulus of the disc at L4-5 and L5-S1. 4.Splenomegaly.  Electronically Signed: Sreekanth Helm MD  2/5/2025 12:25 PM EST  Workstation ID: [id number]    XR Abdomen KUB    Result Date: 1/24/2025  Impression: Nonobstructive bowel gas pattern with mild to moderate stool burden. Electronically Signed: Reji Weiss MD  1/24/2025 3:15 PM EST  Workstation ID: [id number]        Assessment      1.  Inguinal adenopathy with fatigue and fevers.  Likely a viral illness.  Unlikely to be HIV though possible.  I think it is reasonable to test him in a month and see if his symptoms have subsided.  This does not appear to be a malignant process such as lymphoma at this stage at least.  Usually the testing for HIV takes about a month after exposure to be positive.  But while on Truvada, the likelihood that he contracted HIV is fairly small.          Thank you for allowing me to participate in the care of this patient.    Yours sincerely,    Ephraim Ramos MD  [facility]  Hematology and Oncology    Return on: 03/17/25  Return in (Approximately): 1 month    Orders Placed This Encounter   Procedures    HIV-1 / O / 2 Ag / Antibody 4th Generation     Standing Status:   Future     Standing Expiration Date:   2/10/2026     Order Specific Question:   Release to patient     Answer:   Routine Release [[id number]]    CBC & Differential     Standing Status:   Future     Standing Expiration Date:   2/10/2026     Order Specific Question:   Manual Differential     Answer:   No     Order Specific Question:   Release to patient     Answer:   Routine Release [[id number]]

## 2025-02-12 DIAGNOSIS — M54.50 LUMBAR BACK PAIN: Primary | ICD-10-CM

## 2025-02-12 NOTE — TELEPHONE ENCOUNTER
I placed a referral to [facility].  If they cannot get him in at his convenience or have a location that works for him I would recommend [facility].

## 2025-02-12 NOTE — TELEPHONE ENCOUNTER
If he is not having any significant back pain this is not a worrisome finding.  If he is having symptoms, first appropriate step would be to explore PT.  Have him let me know if he would like a referral to PT.

## 2025-02-12 NOTE — TELEPHONE ENCOUNTER
Pt states he is having some mild back pain and would like to try PT.  He stated he will go where ever you suggest. Please advise.

## 2025-03-03 ENCOUNTER — TELEPHONE (OUTPATIENT)
Dept: INTERNAL MEDICINE | Facility: CLINIC | Age: 25
End: 2025-03-03

## 2025-03-03 NOTE — TELEPHONE ENCOUNTER
"  Caller: Reg Nichols \"Will\"    Relationship: Self    Best call back number: [phone]    What is the best time to reach you: ANYTIME     Who are you requesting to speak with (clinical staff, provider,  specific staff member): DOCTOR OR NURSE       What was the call regarding: THE PATIENT WOULD LIKE TO LET THE DOCTOR KNOW THAT OVER THE WEEKEND HE BROKE OUT IN A HIVE RASH HE THINKS IT MAY HAVE BEEN FROM SHRIMP THAT HE ATE BUT HE HAS NO KNOWN SHELL FISH ALLERGY HE STATES THAT THE HIVES WENT AWAY THIS MORNING HE HAD NO FEVER OR CHILLS     HE WOULD ALSO LIKE TO TALK TO SOMEONE ABOUT GETTING A REFERRAL TO A STOMACH DOCTOR HE IS STILL HAVING SOME CONSTIPATION AND BLOOD IN HIS STOOL     "

## 2025-03-05 DIAGNOSIS — K92.1 HEMATOCHEZIA: Primary | ICD-10-CM

## 2025-03-05 DIAGNOSIS — R10.9 ABDOMINAL PAIN, UNSPECIFIED ABDOMINAL LOCATION: ICD-10-CM

## 2025-03-12 ENCOUNTER — TREATMENT (OUTPATIENT)
Dept: PHYSICAL THERAPY | Facility: CLINIC | Age: 25
End: 2025-03-12
Payer: COMMERCIAL

## 2025-03-12 ENCOUNTER — LAB (OUTPATIENT)
Dept: LAB | Facility: HOSPITAL | Age: 25
End: 2025-03-12
Payer: COMMERCIAL

## 2025-03-12 DIAGNOSIS — R53.1 WEAKNESS: ICD-10-CM

## 2025-03-12 DIAGNOSIS — R59.0 LYMPHADENOPATHY, INGUINAL: ICD-10-CM

## 2025-03-12 DIAGNOSIS — M54.50 ACUTE LEFT-SIDED LOW BACK PAIN WITHOUT SCIATICA: Primary | ICD-10-CM

## 2025-03-12 DIAGNOSIS — M54.50 LUMBAR BACK PAIN: ICD-10-CM

## 2025-03-12 LAB
BASOPHILS # BLD AUTO: 0.03 10*3/MM3 (ref 0–0.2)
BASOPHILS NFR BLD AUTO: 0.7 % (ref 0–1.5)
DEPRECATED RDW RBC AUTO: 46.3 FL (ref 37–54)
EOSINOPHIL # BLD AUTO: 0.02 10*3/MM3 (ref 0–0.4)
EOSINOPHIL NFR BLD AUTO: 0.5 % (ref 0.3–6.2)
ERYTHROCYTE [DISTWIDTH] IN BLOOD BY AUTOMATED COUNT: 13.6 % (ref 12.3–15.4)
HCT VFR BLD AUTO: 41.1 % (ref 37.5–51)
HGB BLD-MCNC: 14.1 G/DL (ref 13–17.7)
HIV 1+2 AB+HIV1 P24 AG SERPL QL IA: NORMAL
IMM GRANULOCYTES # BLD AUTO: 0.02 10*3/MM3 (ref 0–0.05)
IMM GRANULOCYTES NFR BLD AUTO: 0.5 % (ref 0–0.5)
LYMPHOCYTES # BLD AUTO: 2.28 10*3/MM3 (ref 0.7–3.1)
LYMPHOCYTES NFR BLD AUTO: 51.8 % (ref 19.6–45.3)
MCH RBC QN AUTO: 32.1 PG (ref 26.6–33)
MCHC RBC AUTO-ENTMCNC: 34.3 G/DL (ref 31.5–35.7)
MCV RBC AUTO: 93.6 FL (ref 79–97)
MONOCYTES # BLD AUTO: 0.41 10*3/MM3 (ref 0.1–0.9)
MONOCYTES NFR BLD AUTO: 9.3 % (ref 5–12)
NEUTROPHILS NFR BLD AUTO: 1.64 10*3/MM3 (ref 1.7–7)
NEUTROPHILS NFR BLD AUTO: 37.2 % (ref 42.7–76)
NRBC BLD AUTO-RTO: 0 /100 WBC (ref 0–0.2)
PLATELET # BLD AUTO: 225 10*3/MM3 (ref 140–450)
PMV BLD AUTO: 8.6 FL (ref 6–12)
RBC # BLD AUTO: 4.39 10*6/MM3 (ref 4.14–5.8)
WBC NRBC COR # BLD AUTO: 4.4 10*3/MM3 (ref 3.4–10.8)

## 2025-03-12 PROCEDURE — 36415 COLL VENOUS BLD VENIPUNCTURE: CPT

## 2025-03-12 PROCEDURE — 85025 COMPLETE CBC W/AUTO DIFF WBC: CPT

## 2025-03-12 PROCEDURE — 97110 THERAPEUTIC EXERCISES: CPT | Performed by: PHYSICAL THERAPIST

## 2025-03-12 PROCEDURE — 97161 PT EVAL LOW COMPLEX 20 MIN: CPT | Performed by: PHYSICAL THERAPIST

## 2025-03-12 PROCEDURE — G0432 EIA HIV-1/HIV-2 SCREEN: HCPCS

## 2025-03-12 NOTE — PROGRESS NOTES
Physical Therapy Initial Evaluation and Plan of Care  [facility] [place]  [address] [address]        Patient: Reg Nichols   : 2000  Diagnosis/ICD-10 Code:  No primary diagnosis found.  Referring practitioner: ELADIA Fischer  Date of Initial Visit: 3/12/2025  Today's Date: 3/12/2025  Patient seen for 1 sessions         Visit Diagnoses:  No diagnosis found.      Subjective Evaluation    History of Present Illness  Mechanism of injury: The pt reported a 2 weeks history of L sided LBP. He had an abdominal CT for an unrelated issue that showed scoliosis and disc bulging, bringing his acute low back pain to his attention.    Pain is primarily L sided and is reproduced with bending forward and is improved with return to stand. He has not attempted any independent management.     He has a history of low back pain but has never sought care.      He would like to return to exercise at [employer].      Patient Occupation:  Pain  Current pain rating: 3  At best pain ratin  At worst pain ratin  Location: L sided back pain  Quality: dull ache  Relieving factors: change in position  Aggravating factors: lifting  Progression: no change    Social Support  Lives in: apartment    Diagnostic Tests  CT scan: abnormal    Treatments  No previous or current treatments  Patient Goals  Patient goals for therapy: decreased pain             Objective          Postural Observations    Additional Postural Observation Details  R TL scoliosis - moderate    Palpation   Left   No palpable tenderness to the quadratus lumborum.   Hypertonic in the erector spinae and lumbar paraspinals.   Tenderness of the erector spinae and lumbar paraspinals.     Right   No palpable tenderness to the lumbar paraspinals and quadratus lumborum.   Hypertonic in the erector spinae.     Tenderness     Lumbar Spine  No tenderness in the spinous process and facet joint.      Neurological Testing     Sensation     Lumbar   Left   Intact: light touch    Right   Intact: light touch    Reflexes   Left   Patellar (L4): normal (2+)  Achilles (S1): normal (2+)    Right   Patellar (L4): normal (2+)  Achilles (S1): normal (2+)    Active Range of Motion     Additional Active Range of Motion Details  Lumbar flexion: 0 cm to the floor   Lumbar extension: 100% - R pelvic shift  R Lateral flexion: 0 cm to KJL  L Lateral flexion: 0 cm to KJL  R Rotation: 50 deg  L Rotation: 50 deg        Strength/Myotome Testing     Left Hip   Planes of Motion   Flexion: 5  Extension: 5  Abduction: 5  External rotation: 5    Right Hip   Planes of Motion   Flexion: 5  Extension: 5  Abduction: 5  External rotation: 5    Left Knee   Flexion: 5  Extension: 5    Right Knee   Flexion: 5  Extension: 5    Left Ankle/Foot   Dorsiflexion: 5  Plantar flexion: 5    Right Ankle/Foot   Dorsiflexion: 5  Plantar flexion: 5          Assessment & Plan       Assessment  Impairments: abnormal muscle firing, abnormal or restricted ROM, activity intolerance, impaired physical strength, lacks appropriate home exercise program and pain with function   Functional limitations: carrying objects, lifting, walking, uncomfortable because of pain, stooping and unable to perform repetitive tasks   Assessment details: The patient is a 24 yo male who presented to PT with evolving characteristics of acute L sided LBP with low complexity. Signs and symptoms are consistent with acute lumbar muscle strain.  He expressed concern over a recent CT showing disc bulging, though his symptoms are inconsistent with a disc injury.  Neurological screening was negative.  He had poor ability to perform a posterior pelvic tilt and to maintain core stabilization through the lumbar spine so he was prescribed a home exercise program for core stabilization exercises.  He will also benefit from independent soft tissue mobilization and stretching, and I anticipate his  pain will quickly resolve.  He was encouraged to return to independent exercise at his own pace.  He should be able to manage his condition independently but his chart will be left open for 30 days in the event symptoms worsen or fail to improve.    Prognosis: good    Goals  Plan Goals: Short Term Goals (4 weeks):     1. The patient will be independent and compliant with initial HEP.       Plan  Therapy options: will be seen for skilled therapy services  Planned modality interventions: cryotherapy, electrical stimulation/Russian stimulation, TENS, iontophoresis, thermotherapy (hydrocollator packs) and traction  Planned therapy interventions: abdominal trunk stabilization, manual therapy, motor coordination training, neuromuscular re-education, ADL retraining, body mechanics training, flexibility, functional ROM exercises, home exercise program, joint mobilization, postural training, soft tissue mobilization, spinal/joint mobilization, strengthening, stretching and therapeutic activities  Frequency: 1x week  Duration in visits: 4  Duration in weeks: 4  Treatment plan discussed with: patient  Plan details: The patient will likely benefit from TE/NMED to include postural reeducation and core strengthening, MT for improved joint mobility, and TA for activity modification and lifting mechanics. Modalities will be utilized as needed for pain modulation.     Patient to attempt independent management.  His chart will be left open for the next 30 days in the event symptoms worsen or fail to improve and he wishes to come back.      History # of Personal Factors and/or Comorbidities: LOW (0)  Examination of Body System(s): # of elements: LOW (1-2)  Clinical Presentation: STABLE   Clinical Decision Making: LOW     Timed:         Manual Therapy:    0     mins  73470;     Therapeutic Exercise:    8     mins  23562;     Neuromuscular Arash:    0    mins  44203;    Therapeutic Activity:     0     mins  74727;     Gait Training:       0     mins  07933;     Ultrasound:     0     mins  80273;    Ionto                               0    mins   61489  Self Care                       0     mins   50241      Un-Timed:  Canalith Repos    0     mins 80642  Group Therapy    0     mins 47368  Electrical Stimulation:    0     mins  79680 ( );  Dry Needling     0     mins self-pay  Traction     0     mins 74671  Low Eval     20     Mins  07395  Mod Eval     0     Mins  12832  High Eval                       0     Mins  87234        Timed Treatment:   8   mins   Total Treatment:     28   mins          PT: Gilbert Egan PT     License Number: [id number]  Electronically signed by Gilbert Egan PT, 03/12/25, 5:34 PM EDT    Initial Certification  Certification Period: 6/10/2025  I certify that the therapy services are furnished while this patient is under my care.  The services outlined above are required by this patient, and will be reviewed every 90 days.     PHYSICIAN: ________________________________________________________  Carmen Arnold APRN        DATE: ____________________________________________________________    Please sign and return via fax to [phone]. Thank you, [facility].

## 2025-03-17 ENCOUNTER — OFFICE VISIT (OUTPATIENT)
Age: 25
End: 2025-03-17
Payer: COMMERCIAL

## 2025-03-17 VITALS
RESPIRATION RATE: 16 BRPM | HEIGHT: 69 IN | SYSTOLIC BLOOD PRESSURE: 127 MMHG | HEART RATE: 73 BPM | BODY MASS INDEX: 18.16 KG/M2 | OXYGEN SATURATION: 100 % | WEIGHT: 122.6 LBS | DIASTOLIC BLOOD PRESSURE: 90 MMHG | TEMPERATURE: 98.1 F

## 2025-03-17 DIAGNOSIS — R59.0 LYMPHADENOPATHY, INGUINAL: Primary | ICD-10-CM

## 2025-03-17 PROCEDURE — 99213 OFFICE O/P EST LOW 20 MIN: CPT | Performed by: INTERNAL MEDICINE

## 2025-03-17 NOTE — PROGRESS NOTES
"  REASON FOR VISIT: Reactive adenopathy    HISTORY OF PRESENT ILLNESS:   25 y.o.  male presents today for follow-up.  He has had repeat HIV testing that was negative.  His lymphadenopathy seems to wax and wane.  Clinically he is feeling a lot better.    Past medical history, social history and family history was reviewed 03/17/25 and unchanged from prior visit.    Review of Systems:    Review of Systems - Oncology         Medications:        Current Outpatient Medications:     emtricitabine-tenofovir (TRUVADA) 200-300 MG per tablet, Take 1 tablet by mouth Daily., Disp: 90 tablet, Rfl: 0    ondansetron ODT (ZOFRAN-ODT) 4 MG disintegrating tablet, Place 1 tablet on the tongue Every 8 (Eight) Hours As Needed for Nausea or Vomiting., Disp: 15 tablet, Rfl: 0           ALLERGIES:  No Known Allergies      Physical Exam    VITAL SIGNS:  /90   Pulse 73   Temp 98.1 °F (36.7 °C) (Temporal)   Resp 16   Ht 175.3 cm (69\")   Wt 55.6 kg (122 lb 9.6 oz)   SpO2 100%   BMI 18.10 kg/m²     ECOG score: 0           Wt Readings from Last 3 Encounters:   03/17/25 55.6 kg (122 lb 9.6 oz)   02/10/25 56.7 kg (125 lb)   02/06/25 55.3 kg (122 lb)       Body mass index is 18.1 kg/m². Body surface area is 1.68 meters squared.       Performance Status: 0    General: well appearing, in no acute distress  HEENT: sclera anicteric, neck is supple  Lymphatics: no cervical, supraclavicular, or axillary adenopathy  Cardiovascular: regular rate and rhythm, no murmurs, rubs or gallops  Lungs: clear to auscultation bilaterally  Abdomen: soft, nontender, nondistended.  No palpable organomegaly  Extremities: no lower extremity edema  Skin: no rashes, lesions, bruising, or petechiae  Msk:  Shows no weakness of the large muscle groups  Psych: Mood is stable        RECENT LABS:    Lab Results   Component Value Date    HGB 14.1 03/12/2025    HCT 41.1 03/12/2025    MCV 93.6 03/12/2025     03/12/2025    WBC 4.40 03/12/2025    NEUTROABS 1.64 (L) " 03/12/2025    LYMPHSABS 2.28 03/12/2025    MONOSABS 0.41 03/12/2025    EOSABS 0.02 03/12/2025    BASOSABS 0.03 03/12/2025       Lab Results   Component Value Date    GLUCOSE 95 01/30/2025    BUN 11 01/30/2025    CREATININE 0.94 01/30/2025     01/30/2025    K 4.0 01/30/2025     01/30/2025    CO2 26.8 01/30/2025    CALCIUM 9.2 01/30/2025    PROTEINTOT 7.7 01/30/2025    ALBUMIN 4.7 01/30/2025    BILITOT 0.7 01/30/2025    ALKPHOS 97 01/30/2025    AST 31 01/30/2025    ALT 33 01/30/2025       CT Abdomen Pelvis Without Contrast  Result Date: 2/5/2025  Impression: 1.There is a slightly prominent left inguinal lymph node that may be reactive in nature. There are additional small mesenteric and retroperitoneal lymph nodes which may also be reactive in nature. 2.Scoliosis thoracolumbar spine. 3.Some bulging of the annulus of the disc at L4-5 and L5-S1. 4.Splenomegaly. Electronically Signed: Sreekanth Helm MD  2/5/2025 12:25 PM EST  Workstation ID: [id number]    XR Abdomen KUB  Result Date: 1/24/2025  Impression: Nonobstructive bowel gas pattern with mild to moderate stool burden. Electronically Signed: Reji Weiss MD  1/24/2025 3:15 PM EST  Workstation ID: [id number]          Assessment/Plan    1.  Reactive adenopathy likely due to underlying viral illness.  Seems to have recovered from that.  This will continue to wax and wane for some time.  Does not appear to be due to malignant process or HIV.  For now no further workup necessary.      Ephraim Ramos MD  [facility] Hematology and Oncology         No orders of the defined types were placed in this encounter.      3/17/2025     Future Appointments           Provider Department Center     5/7/2025 8:30 AM Carmen Arnold APRN [facility] [place]     5/9/2025 1:30 PM (Arrive by 1:00 PM) Lilly Dong MD [facility] GASTROENTEROLOGY [place]

## 2025-04-08 ENCOUNTER — TELEPHONE (OUTPATIENT)
Dept: GASTROENTEROLOGY | Facility: CLINIC | Age: 25
End: 2025-04-08
Payer: COMMERCIAL

## 2025-04-08 NOTE — TELEPHONE ENCOUNTER
I spoke with Will. He's experiencing constipation and rectal bleeding on and off. No history of hemorrhoids. Patient wanted to try and get in sooner with Dr Dong. I offered him an appointment for 4/17/2025. Patient stated he will call back. I advised patient to go to the ER if symptoms worsened. Patient agreed.

## 2025-04-28 RX ORDER — SODIUM, POTASSIUM,MAG SULFATES 17.5-3.13G
1 SOLUTION, RECONSTITUTED, ORAL ORAL TAKE AS DIRECTED
Qty: 354 ML | Refills: 0 | Status: SHIPPED | OUTPATIENT
Start: 2025-04-28

## 2025-05-03 DIAGNOSIS — Z29.81 ENCOUNTER FOR HIV PRE-EXPOSURE PROPHYLAXIS: ICD-10-CM

## 2025-05-05 RX ORDER — EMTRICITABINE AND TENOFOVIR DISOPROXIL FUMARATE 200; 300 MG/1; MG/1
1 TABLET, FILM COATED ORAL DAILY
Qty: 90 TABLET | Refills: 0 | Status: SHIPPED | OUTPATIENT
Start: 2025-05-05

## 2025-05-05 NOTE — TELEPHONE ENCOUNTER
Rx Refill Note  Requested Prescriptions     Pending Prescriptions Disp Refills    emtricitabine-tenofovir (TRUVADA) 200-300 MG per tablet [Pharmacy Med Name: EMTRICITABINE/TENOF 200-300MG TABS] 90 tablet 0     Sig: TAKE 1 TABLET BY MOUTH DAILY      Last office visit with prescribing clinician: 1/22/2025   Last telemedicine visit with prescribing clinician: Visit date not found   Next office visit with prescribing clinician: 5/7/2025                         Would you like a call back once the refill request has been completed: [] Yes [] No    If the office needs to give you a call back, can they leave a voicemail: [] Yes [] No    Sharri Rios LPN  05/05/25, 08:13 EDT

## 2025-05-06 NOTE — PROGRESS NOTES
Reg Nichols  2000  [id number]  Patient Care Team:  Carmen Arnold APRN as PCP - General (Internal Medicine)  Ephraim Ramos MD as Consulting Physician (Hematology)    Reg Nichols is a pleasant 25 y.o. male who presents for evaluation of Annual Exam    Chief Complaint   Patient presents with    Annual Exam       HPI:   History of Present Illness  Will is a 25-year-old male who presents for a physical exam.    He reports an overall improvement in his health status, with a scheduled colonoscopy on Friday. A specialist did not find anything concerning.He feels significantly better after prolonged viral GI illness and has resumed his exercise regimen. He has not experienced any fevers since 02/2025 and reports no significant weight loss. His weight dropped to 122 pounds during his illness but has since increased to 125 pounds. He maintains a high protein diet and adequate hydration. He has discontinued fiber supplements due to their ineffectiveness.     He continues to take Truvada consistently and uses condoms during intercourse. He reports no new sexual partners and is not experiencing any abdominal pain.    Approximately 2 weeks ago, he sought urgent care for suspected strep throat, which was later diagnosed as allergies, resulting in a persistent cough for a week.     Past Medical History:   Diagnosis Date    Allergic 2008    Anxiety 2014    Asthma 2008    Scoliosis 2015     Past Surgical History:   Procedure Laterality Date    APPENDECTOMY  2007    TONSILLECTOMY  2010     Family History   Adopted: Yes   Family history unknown: Yes     Social History     Tobacco Use   Smoking Status Never   Smokeless Tobacco Never     No Known Allergies    Current Outpatient Medications:     emtricitabine-tenofovir (TRUVADA) 200-300 MG per tablet, TAKE 1 TABLET BY MOUTH DAILY, Disp: 90 tablet, Rfl: 0    ondansetron ODT (ZOFRAN-ODT) 4 MG disintegrating tablet, Place 1 tablet on the tongue Every 8 (Eight)  "Hours As Needed for Nausea or Vomiting., Disp: 15 tablet, Rfl: 0    sodium-potassium-magnesium sulfates (Suprep Bowel Prep Kit) 17.5-3.13-1.6 GM/177ML solution oral solution, Take 1 bottle by mouth Take As Directed., Disp: 354 mL, Rfl: 0    Review of systems was completed, and pertinent findings are noted in the HPI.  /76 (BP Location: Left arm, Patient Position: Sitting, Cuff Size: Adult)   Pulse 92   Ht 172.5 cm (67.91\")   Wt 55.8 kg (123 lb)   BMI 18.75 kg/m²     Physical Exam  Vitals reviewed.   Constitutional:       Appearance: Normal appearance. He is well-developed and underweight.   HENT:      Head: Normocephalic.      Right Ear: External ear normal.      Left Ear: External ear normal.   Eyes:      Conjunctiva/sclera: Conjunctivae normal.      Pupils: Pupils are equal, round, and reactive to light.   Cardiovascular:      Rate and Rhythm: Normal rate and regular rhythm.      Pulses: Normal pulses.      Heart sounds: Normal heart sounds.   Pulmonary:      Effort: Pulmonary effort is normal.      Breath sounds: Normal breath sounds.   Abdominal:      General: Abdomen is flat. Bowel sounds are normal.      Palpations: Abdomen is soft.   Musculoskeletal:         General: Normal range of motion.      Cervical back: Normal range of motion and neck supple.   Skin:     General: Skin is warm and dry.   Neurological:      Mental Status: He is alert and oriented to person, place, and time.   Psychiatric:         Mood and Affect: Mood normal.         Behavior: Behavior normal.         Thought Content: Thought content normal.         Judgment: Judgment normal.         PHQ-9 Total Score:      Assessment/Plan:  Diagnoses and all orders for this visit:    1. Routine medical exam (Primary)  -     CBC & Differential; Future  -     Comprehensive Metabolic Panel; Future  -     Lipid Panel; Future  -     TSH Rfx On Abnormal To Free T4; Future    2. Routine screening for STI (sexually transmitted infection)  -     " Chlamydia trachomatis, Neisseria gonorrhoeae, PCR - Urine, Urine, Clean Catch; Future  -     HIV-1 / O / 2 Ag / Antibody; Future  -     RPR Qualitative with Reflex to Quant; Future       Assessment & Plan  1. Health maintenance.  - Routine physical examination conducted.  - Regular STI screening labs will need to be drawn between mid to end of June 2025.  - Colonoscopy scheduled for 05/09/2025.    2. Allergies.  Resolved at this time, no follow-up needed.      Follow-up  - Follow-up visit scheduled in 6 months or sooner if necessary.  There are no Patient Instructions on file for this visit.  Plan of care reviewed with patient at the conclusion of today's visit. Education was provided regarding diagnosis, management and any prescribed or recommended OTC medications.  Patient verbalizes understanding of and agreement with management plan.    Return in about 6 months (around 11/7/2025) for Next scheduled follow up.    Dictated Utilizing Dragon Dictation.    ELADIA Fischer       Patient or patient representative verbalized consent for the use of Ambient Listening during the visit with  ELADIA Fischer for chart documentation. 5/7/2025  10:28 EDT

## 2025-05-07 ENCOUNTER — OFFICE VISIT (OUTPATIENT)
Dept: INTERNAL MEDICINE | Facility: CLINIC | Age: 25
End: 2025-05-07
Payer: COMMERCIAL

## 2025-05-07 VITALS
HEART RATE: 92 BPM | SYSTOLIC BLOOD PRESSURE: 104 MMHG | BODY MASS INDEX: 18.64 KG/M2 | DIASTOLIC BLOOD PRESSURE: 76 MMHG | WEIGHT: 123 LBS | HEIGHT: 68 IN

## 2025-05-07 DIAGNOSIS — Z11.3 ROUTINE SCREENING FOR STI (SEXUALLY TRANSMITTED INFECTION): ICD-10-CM

## 2025-05-07 DIAGNOSIS — Z00.00 ROUTINE MEDICAL EXAM: Primary | ICD-10-CM

## 2025-05-07 PROCEDURE — 99395 PREV VISIT EST AGE 18-39: CPT | Performed by: NURSE PRACTITIONER

## 2025-05-07 NOTE — PATIENT INSTRUCTIONS
Preventive Care 21-39 Years Old, Male  Preventive care refers to lifestyle choices and visits with your health care provider that can promote health and wellness. Preventive care visits are also called wellness exams.  What can I expect for my preventive care visit?  Counseling  During your preventive care visit, your health care provider may ask about your:  Medical history, including:  Past medical problems.  Family medical history.  Current health, including:  Emotional well-being.  Home life and relationship well-being.  Sexual activity.  Lifestyle, including:  Alcohol, nicotine or tobacco, and drug use.  Access to firearms.  Diet, exercise, and sleep habits.  Safety issues such as seatbelt and bike helmet use.  Sunscreen use.  Work and work environment.  Physical exam  Your health care provider may check your:  Height and weight. These may be used to calculate your BMI (body mass index). BMI is a measurement that tells if you are at a healthy weight.  Waist circumference. This measures the distance around your waistline. This measurement also tells if you are at a healthy weight and may help predict your risk of certain diseases, such as type 2 diabetes and high blood pressure.  Heart rate and blood pressure.  Body temperature.  Skin for abnormal spots.  What immunizations do I need?    Vaccines are usually given at various ages, according to a schedule. Your health care provider will recommend vaccines for you based on your age, medical history, and lifestyle or other factors, such as travel or where you work.  What tests do I need?  Screening  Your health care provider may recommend screening tests for certain conditions. This may include:  Lipid and cholesterol levels.  Diabetes screening. This is done by checking your blood sugar (glucose) after you have not eaten for a while (fasting).  Hepatitis B test.  Hepatitis C test.  HIV (human immunodeficiency virus) test.  STI (sexually transmitted infection)  testing, if you are at risk.  Talk with your health care provider about your test results, treatment options, and if necessary, the need for more tests.  Follow these instructions at home:  Eating and drinking    Eat a healthy diet that includes fresh fruits and vegetables, whole grains, lean protein, and low-fat dairy products.  Drink enough fluid to keep your urine pale yellow.  Take vitamin and mineral supplements as recommended by your health care provider.  Do not drink alcohol if your health care provider tells you not to drink.  If you drink alcohol:  Limit how much you have to 0-2 drinks a day.  Know how much alcohol is in your drink. In the [place] one drink equals one 12 oz bottle of beer (355 mL), one 5 oz glass of wine (148 mL), or one 1½ oz glass of hard liquor (44 mL).  Lifestyle  Brush your teeth every morning and night with fluoride toothpaste. Floss one time each day.  Exercise for at least 30 minutes 5 or more days each week.  Do not use any products that contain nicotine or tobacco. These products include cigarettes, chewing tobacco, and vaping devices, such as e-cigarettes. If you need help quitting, ask your health care provider.  Do not use drugs.  If you are sexually active, practice safe sex. Use a condom or other form of protection to prevent STIs.  Find healthy ways to manage stress, such as:  Meditation, yoga, or listening to music.  Journaling.  Talking to a trusted person.  Spending time with friends and family.  Minimize exposure to UV radiation to reduce your risk of skin cancer.  Safety  Always wear your seat belt while driving or riding in a vehicle.  Do not drive:  If you have been drinking alcohol. Do not ride with someone who has been drinking.  If you have been using any mind-altering substances or drugs.  While texting.  When you are tired or distracted.  Wear a helmet and other protective equipment during sports activities.  If you have firearms in your house, make sure you  follow all gun safety procedures.  Seek help if you have been physically or sexually abused.  What's next?  Go to your health care provider once a year for an annual wellness visit.  Ask your health care provider how often you should have your eyes and teeth checked.  Stay up to date on all vaccines.  This information is not intended to replace advice given to you by your health care provider. Make sure you discuss any questions you have with your health care provider.  Document Revised: 06/15/2022 Document Reviewed: 06/15/2022  [employer] Patient Education © 2024 [employer]

## 2025-05-09 ENCOUNTER — OUTSIDE FACILITY SERVICE (OUTPATIENT)
Dept: GASTROENTEROLOGY | Facility: CLINIC | Age: 25
End: 2025-05-09
Payer: COMMERCIAL

## 2025-05-09 PROCEDURE — 45378 DIAGNOSTIC COLONOSCOPY: CPT | Performed by: INTERNAL MEDICINE

## 2025-08-10 DIAGNOSIS — Z29.81 ENCOUNTER FOR HIV PRE-EXPOSURE PROPHYLAXIS: ICD-10-CM

## 2025-08-11 RX ORDER — EMTRICITABINE AND TENOFOVIR DISOPROXIL FUMARATE 200; 300 MG/1; MG/1
1 TABLET, FILM COATED ORAL DAILY
Qty: 90 TABLET | Refills: 0 | Status: SHIPPED | OUTPATIENT
Start: 2025-08-11

## 2025-08-18 ENCOUNTER — LAB (OUTPATIENT)
Dept: LAB | Facility: HOSPITAL | Age: 25
End: 2025-08-18
Payer: COMMERCIAL

## 2025-08-18 ENCOUNTER — OFFICE VISIT (OUTPATIENT)
Dept: INTERNAL MEDICINE | Facility: CLINIC | Age: 25
End: 2025-08-18
Payer: COMMERCIAL

## 2025-08-18 VITALS
WEIGHT: 125 LBS | BODY MASS INDEX: 18.94 KG/M2 | TEMPERATURE: 98.6 F | HEART RATE: 76 BPM | DIASTOLIC BLOOD PRESSURE: 74 MMHG | HEIGHT: 68 IN | SYSTOLIC BLOOD PRESSURE: 116 MMHG

## 2025-08-18 DIAGNOSIS — Z00.00 ROUTINE MEDICAL EXAM: ICD-10-CM

## 2025-08-18 DIAGNOSIS — M54.42 CHRONIC BILATERAL LOW BACK PAIN WITH LEFT-SIDED SCIATICA: Primary | ICD-10-CM

## 2025-08-18 DIAGNOSIS — Z11.3 ROUTINE SCREENING FOR STI (SEXUALLY TRANSMITTED INFECTION): ICD-10-CM

## 2025-08-18 DIAGNOSIS — G89.29 CHRONIC BILATERAL LOW BACK PAIN WITH LEFT-SIDED SCIATICA: Primary | ICD-10-CM

## 2025-08-18 LAB
ALBUMIN SERPL-MCNC: 4.9 G/DL (ref 3.5–5.2)
ALBUMIN/GLOB SERPL: 1.8 G/DL
ALP SERPL-CCNC: 132 U/L (ref 39–117)
ALT SERPL W P-5'-P-CCNC: 34 U/L (ref 1–41)
ANION GAP SERPL CALCULATED.3IONS-SCNC: 13.5 MMOL/L (ref 5–15)
AST SERPL-CCNC: 30 U/L (ref 1–40)
BASOPHILS # BLD MANUAL: 0.04 10*3/MM3 (ref 0–0.2)
BASOPHILS NFR BLD MANUAL: 1 % (ref 0–1.5)
BILIRUB SERPL-MCNC: 0.3 MG/DL (ref 0–1.2)
BUN SERPL-MCNC: 7 MG/DL (ref 6–20)
BUN/CREAT SERPL: 8.1 (ref 7–25)
CALCIUM SPEC-SCNC: 9.6 MG/DL (ref 8.6–10.5)
CHLORIDE SERPL-SCNC: 105 MMOL/L (ref 98–107)
CHOLEST SERPL-MCNC: 141 MG/DL (ref 0–200)
CO2 SERPL-SCNC: 24.5 MMOL/L (ref 22–29)
CREAT SERPL-MCNC: 0.86 MG/DL (ref 0.76–1.27)
DEPRECATED RDW RBC AUTO: 44.1 FL (ref 37–54)
EGFRCR SERPLBLD CKD-EPI 2021: 124 ML/MIN/1.73
EOSINOPHIL # BLD MANUAL: 0.09 10*3/MM3 (ref 0–0.4)
EOSINOPHIL NFR BLD MANUAL: 2 % (ref 0.3–6.2)
ERYTHROCYTE [DISTWIDTH] IN BLOOD BY AUTOMATED COUNT: 12.7 % (ref 12.3–15.4)
GLOBULIN UR ELPH-MCNC: 2.8 GM/DL
GLUCOSE SERPL-MCNC: 101 MG/DL (ref 65–99)
HCT VFR BLD AUTO: 43 % (ref 37.5–51)
HDLC SERPL-MCNC: 38 MG/DL (ref 40–60)
HGB BLD-MCNC: 14.8 G/DL (ref 13–17.7)
HIV 1+2 AB+HIV1 P24 AG SERPL QL IA: NORMAL
LDLC SERPL CALC-MCNC: 86 MG/DL (ref 0–100)
LDLC/HDLC SERPL: 2.24 {RATIO}
LYMPHOCYTES # BLD MANUAL: 2.44 10*3/MM3 (ref 0.7–3.1)
LYMPHOCYTES NFR BLD MANUAL: 6.1 % (ref 5–12)
MCH RBC QN AUTO: 33.3 PG (ref 26.6–33)
MCHC RBC AUTO-ENTMCNC: 34.4 G/DL (ref 31.5–35.7)
MCV RBC AUTO: 96.8 FL (ref 79–97)
MONOCYTES # BLD: 0.27 10*3/MM3 (ref 0.1–0.9)
NEUTROPHILS # BLD AUTO: 1.56 10*3/MM3 (ref 1.7–7)
NEUTROPHILS NFR BLD MANUAL: 35.4 % (ref 42.7–76)
NRBC BLD AUTO-RTO: 0 /100 WBC (ref 0–0.2)
PLAT MORPH BLD: NORMAL
PLATELET # BLD AUTO: 214 10*3/MM3 (ref 140–450)
PMV BLD AUTO: 8.5 FL (ref 6–12)
POTASSIUM SERPL-SCNC: 3.8 MMOL/L (ref 3.5–5.2)
PROT SERPL-MCNC: 7.7 G/DL (ref 6–8.5)
RBC # BLD AUTO: 4.44 10*6/MM3 (ref 4.14–5.8)
RBC MORPH BLD: NORMAL
SODIUM SERPL-SCNC: 143 MMOL/L (ref 136–145)
TRIGL SERPL-MCNC: 89 MG/DL (ref 0–150)
TSH SERPL DL<=0.05 MIU/L-ACNC: 2.4 UIU/ML (ref 0.27–4.2)
VARIANT LYMPHS NFR BLD MANUAL: 4 % (ref 0–5)
VARIANT LYMPHS NFR BLD MANUAL: 51.5 % (ref 19.6–45.3)
VLDLC SERPL-MCNC: 17 MG/DL (ref 5–40)
WBC MORPH BLD: NORMAL
WBC NRBC COR # BLD AUTO: 4.4 10*3/MM3 (ref 3.4–10.8)

## 2025-08-18 PROCEDURE — 85007 BL SMEAR W/DIFF WBC COUNT: CPT

## 2025-08-18 PROCEDURE — 36415 COLL VENOUS BLD VENIPUNCTURE: CPT

## 2025-08-18 PROCEDURE — 87591 N.GONORRHOEAE DNA AMP PROB: CPT

## 2025-08-18 PROCEDURE — 80061 LIPID PANEL: CPT

## 2025-08-18 PROCEDURE — 80050 GENERAL HEALTH PANEL: CPT

## 2025-08-18 PROCEDURE — 99213 OFFICE O/P EST LOW 20 MIN: CPT

## 2025-08-18 PROCEDURE — 87491 CHLMYD TRACH DNA AMP PROBE: CPT

## 2025-08-18 PROCEDURE — G0432 EIA HIV-1/HIV-2 SCREEN: HCPCS

## 2025-08-18 PROCEDURE — 86592 SYPHILIS TEST NON-TREP QUAL: CPT

## 2025-08-19 ENCOUNTER — RESULTS FOLLOW-UP (OUTPATIENT)
Dept: LAB | Facility: HOSPITAL | Age: 25
End: 2025-08-19
Payer: COMMERCIAL

## 2025-08-19 LAB
C TRACH DNA SPEC QL NAA+PROBE: NOT DETECTED
N GONORRHOEA RRNA SPEC QL NAA+PROBE: NOT DETECTED
RPR SER QL: NORMAL

## 2025-08-29 ENCOUNTER — HOSPITAL ENCOUNTER (OUTPATIENT)
Facility: HOSPITAL | Age: 25
Discharge: HOME | End: 2025-08-29
Payer: COMMERCIAL

## 2025-08-29 DIAGNOSIS — M54.42 CHRONIC BILATERAL LOW BACK PAIN WITH LEFT-SIDED SCIATICA: ICD-10-CM

## 2025-08-29 DIAGNOSIS — G89.29 CHRONIC BILATERAL LOW BACK PAIN WITH LEFT-SIDED SCIATICA: ICD-10-CM

## 2025-08-29 PROCEDURE — 25510000001 IOPAMIDOL 61 % SOLUTION

## 2025-08-29 PROCEDURE — 72133 CT LUMBAR SPINE W/O & W/DYE: CPT

## 2025-08-29 RX ORDER — IOPAMIDOL 612 MG/ML
100 INJECTION, SOLUTION INTRAVASCULAR
Status: COMPLETED | OUTPATIENT
Start: 2025-08-29 | End: 2025-08-29

## 2025-08-29 RX ADMIN — IOPAMIDOL 85 ML: 612 INJECTION, SOLUTION INTRAVENOUS at 10:33
